# Patient Record
Sex: MALE | Race: WHITE | NOT HISPANIC OR LATINO | URBAN - METROPOLITAN AREA
[De-identification: names, ages, dates, MRNs, and addresses within clinical notes are randomized per-mention and may not be internally consistent; named-entity substitution may affect disease eponyms.]

---

## 2017-01-03 ENCOUNTER — AMBULATORY - HEALTHEAST (OUTPATIENT)
Dept: INTERNAL MEDICINE | Facility: CLINIC | Age: 62
End: 2017-01-03

## 2017-01-03 ENCOUNTER — COMMUNICATION - HEALTHEAST (OUTPATIENT)
Dept: INTERNAL MEDICINE | Facility: CLINIC | Age: 62
End: 2017-01-03

## 2017-01-03 DIAGNOSIS — K43.9 VENTRAL HERNIA: ICD-10-CM

## 2017-01-04 ENCOUNTER — COMMUNICATION - HEALTHEAST (OUTPATIENT)
Dept: INTERNAL MEDICINE | Facility: CLINIC | Age: 62
End: 2017-01-04

## 2017-01-08 ENCOUNTER — COMMUNICATION - HEALTHEAST (OUTPATIENT)
Dept: INTERNAL MEDICINE | Facility: CLINIC | Age: 62
End: 2017-01-08

## 2017-01-08 DIAGNOSIS — R52 PAIN: ICD-10-CM

## 2017-01-10 ENCOUNTER — OFFICE VISIT - HEALTHEAST (OUTPATIENT)
Dept: INTERNAL MEDICINE | Facility: CLINIC | Age: 62
End: 2017-01-10

## 2017-01-10 DIAGNOSIS — R63.4 WEIGHT LOSS: ICD-10-CM

## 2017-01-10 DIAGNOSIS — R73.9 HYPERGLYCEMIA: ICD-10-CM

## 2017-01-10 DIAGNOSIS — I10 ESSENTIAL HYPERTENSION WITH GOAL BLOOD PRESSURE LESS THAN 140/90: ICD-10-CM

## 2017-01-10 DIAGNOSIS — K43.9 VENTRAL HERNIA: ICD-10-CM

## 2017-01-10 DIAGNOSIS — M54.9 BACK PAIN: ICD-10-CM

## 2017-01-10 ASSESSMENT — MIFFLIN-ST. JEOR: SCORE: 1469.39

## 2017-01-13 ENCOUNTER — COMMUNICATION - HEALTHEAST (OUTPATIENT)
Dept: INTERNAL MEDICINE | Facility: CLINIC | Age: 62
End: 2017-01-13

## 2017-01-13 DIAGNOSIS — R52 PAIN: ICD-10-CM

## 2017-01-16 ENCOUNTER — COMMUNICATION - HEALTHEAST (OUTPATIENT)
Dept: INTERNAL MEDICINE | Facility: CLINIC | Age: 62
End: 2017-01-16

## 2017-01-16 DIAGNOSIS — R52 PAIN: ICD-10-CM

## 2017-01-22 ENCOUNTER — COMMUNICATION - HEALTHEAST (OUTPATIENT)
Dept: INTERNAL MEDICINE | Facility: CLINIC | Age: 62
End: 2017-01-22

## 2017-01-22 DIAGNOSIS — R52 PAIN: ICD-10-CM

## 2017-01-26 ENCOUNTER — AMBULATORY - HEALTHEAST (OUTPATIENT)
Dept: INTERNAL MEDICINE | Facility: CLINIC | Age: 62
End: 2017-01-26

## 2017-01-26 ENCOUNTER — COMMUNICATION - HEALTHEAST (OUTPATIENT)
Dept: SCHEDULING | Facility: CLINIC | Age: 62
End: 2017-01-26

## 2017-01-27 ENCOUNTER — COMMUNICATION - HEALTHEAST (OUTPATIENT)
Dept: INTERNAL MEDICINE | Facility: CLINIC | Age: 62
End: 2017-01-27

## 2017-01-27 ENCOUNTER — COMMUNICATION - HEALTHEAST (OUTPATIENT)
Dept: SCHEDULING | Facility: CLINIC | Age: 62
End: 2017-01-27

## 2017-01-30 ENCOUNTER — AMBULATORY - HEALTHEAST (OUTPATIENT)
Dept: INTERNAL MEDICINE | Facility: CLINIC | Age: 62
End: 2017-01-30

## 2017-01-30 ENCOUNTER — COMMUNICATION - HEALTHEAST (OUTPATIENT)
Dept: INTERNAL MEDICINE | Facility: CLINIC | Age: 62
End: 2017-01-30

## 2017-01-30 DIAGNOSIS — R52 PAIN: ICD-10-CM

## 2017-02-03 ENCOUNTER — RECORDS - HEALTHEAST (OUTPATIENT)
Dept: ADMINISTRATIVE | Facility: OTHER | Age: 62
End: 2017-02-03

## 2017-02-03 ENCOUNTER — AMBULATORY - HEALTHEAST (OUTPATIENT)
Dept: INTERNAL MEDICINE | Facility: CLINIC | Age: 62
End: 2017-02-03

## 2017-02-03 ENCOUNTER — COMMUNICATION - HEALTHEAST (OUTPATIENT)
Dept: INTERNAL MEDICINE | Facility: CLINIC | Age: 62
End: 2017-02-03

## 2017-02-03 DIAGNOSIS — G89.29 CHRONIC BILATERAL BACK PAIN, UNSPECIFIED BACK LOCATION: ICD-10-CM

## 2017-02-03 DIAGNOSIS — R10.2 HYPOGASTRIC PAIN: ICD-10-CM

## 2017-02-03 DIAGNOSIS — M54.9 CHRONIC BILATERAL BACK PAIN, UNSPECIFIED BACK LOCATION: ICD-10-CM

## 2017-02-04 ENCOUNTER — COMMUNICATION - HEALTHEAST (OUTPATIENT)
Dept: SCHEDULING | Facility: CLINIC | Age: 62
End: 2017-02-04

## 2017-02-06 ENCOUNTER — COMMUNICATION - HEALTHEAST (OUTPATIENT)
Dept: INTERNAL MEDICINE | Facility: CLINIC | Age: 62
End: 2017-02-06

## 2017-02-07 ENCOUNTER — OFFICE VISIT - HEALTHEAST (OUTPATIENT)
Dept: INTERNAL MEDICINE | Facility: CLINIC | Age: 62
End: 2017-02-07

## 2017-02-07 DIAGNOSIS — G89.29 CHRONIC BILATERAL BACK PAIN, UNSPECIFIED BACK LOCATION: ICD-10-CM

## 2017-02-07 DIAGNOSIS — M54.9 CHRONIC BILATERAL BACK PAIN, UNSPECIFIED BACK LOCATION: ICD-10-CM

## 2017-02-07 ASSESSMENT — MIFFLIN-ST. JEOR: SCORE: 1451.24

## 2017-02-15 ENCOUNTER — RECORDS - HEALTHEAST (OUTPATIENT)
Dept: ADMINISTRATIVE | Facility: OTHER | Age: 62
End: 2017-02-15

## 2017-02-16 ENCOUNTER — COMMUNICATION - HEALTHEAST (OUTPATIENT)
Dept: INTERNAL MEDICINE | Facility: CLINIC | Age: 62
End: 2017-02-16

## 2017-02-16 ENCOUNTER — RECORDS - HEALTHEAST (OUTPATIENT)
Dept: ADMINISTRATIVE | Facility: OTHER | Age: 62
End: 2017-02-16

## 2017-02-17 ENCOUNTER — RECORDS - HEALTHEAST (OUTPATIENT)
Dept: ADMINISTRATIVE | Facility: OTHER | Age: 62
End: 2017-02-17

## 2017-02-20 ENCOUNTER — RECORDS - HEALTHEAST (OUTPATIENT)
Dept: ADMINISTRATIVE | Facility: OTHER | Age: 62
End: 2017-02-20

## 2017-02-21 ENCOUNTER — COMMUNICATION - HEALTHEAST (OUTPATIENT)
Dept: INTERNAL MEDICINE | Facility: CLINIC | Age: 62
End: 2017-02-21

## 2017-03-03 ENCOUNTER — RECORDS - HEALTHEAST (OUTPATIENT)
Dept: ADMINISTRATIVE | Facility: OTHER | Age: 62
End: 2017-03-03

## 2017-03-06 ENCOUNTER — RECORDS - HEALTHEAST (OUTPATIENT)
Dept: ADMINISTRATIVE | Facility: OTHER | Age: 62
End: 2017-03-06

## 2017-03-07 ENCOUNTER — AMBULATORY - HEALTHEAST (OUTPATIENT)
Dept: INTERNAL MEDICINE | Facility: CLINIC | Age: 62
End: 2017-03-07

## 2017-03-07 ENCOUNTER — COMMUNICATION - HEALTHEAST (OUTPATIENT)
Dept: INTERNAL MEDICINE | Facility: CLINIC | Age: 62
End: 2017-03-07

## 2017-03-07 DIAGNOSIS — M54.6 BACK PAIN OF THORACOLUMBAR REGION: ICD-10-CM

## 2017-03-07 DIAGNOSIS — M54.50 BACK PAIN OF THORACOLUMBAR REGION: ICD-10-CM

## 2017-03-08 ENCOUNTER — COMMUNICATION - HEALTHEAST (OUTPATIENT)
Dept: INTERNAL MEDICINE | Facility: CLINIC | Age: 62
End: 2017-03-08

## 2017-03-10 ENCOUNTER — COMMUNICATION - HEALTHEAST (OUTPATIENT)
Dept: INTERNAL MEDICINE | Facility: CLINIC | Age: 62
End: 2017-03-10

## 2017-03-13 ENCOUNTER — RECORDS - HEALTHEAST (OUTPATIENT)
Dept: ADMINISTRATIVE | Facility: OTHER | Age: 62
End: 2017-03-13

## 2017-03-14 ENCOUNTER — COMMUNICATION - HEALTHEAST (OUTPATIENT)
Dept: INTERNAL MEDICINE | Facility: CLINIC | Age: 62
End: 2017-03-14

## 2017-03-14 ENCOUNTER — HOSPITAL ENCOUNTER (OUTPATIENT)
Dept: PET IMAGING | Facility: HOSPITAL | Age: 62
Discharge: HOME | End: 2017-03-14
Attending: INTERNAL MEDICINE

## 2017-03-14 DIAGNOSIS — M54.6 BACK PAIN OF THORACOLUMBAR REGION: ICD-10-CM

## 2017-03-14 DIAGNOSIS — M54.50 BACK PAIN OF THORACOLUMBAR REGION: ICD-10-CM

## 2017-03-14 ASSESSMENT — MIFFLIN-ST. JEOR: SCORE: 1419.49

## 2017-03-17 ENCOUNTER — OFFICE VISIT - HEALTHEAST (OUTPATIENT)
Dept: INTERNAL MEDICINE | Facility: CLINIC | Age: 62
End: 2017-03-17

## 2017-03-17 DIAGNOSIS — G89.29 CHRONIC PAIN: ICD-10-CM

## 2017-03-17 ASSESSMENT — MIFFLIN-ST. JEOR: SCORE: 1442.17

## 2017-03-20 ENCOUNTER — RECORDS - HEALTHEAST (OUTPATIENT)
Dept: ADMINISTRATIVE | Facility: OTHER | Age: 62
End: 2017-03-20

## 2017-04-05 ENCOUNTER — COMMUNICATION - HEALTHEAST (OUTPATIENT)
Dept: INTERNAL MEDICINE | Facility: CLINIC | Age: 62
End: 2017-04-05

## 2017-04-11 ENCOUNTER — COMMUNICATION - HEALTHEAST (OUTPATIENT)
Dept: INTERNAL MEDICINE | Facility: CLINIC | Age: 62
End: 2017-04-11

## 2017-04-18 ENCOUNTER — COMMUNICATION - HEALTHEAST (OUTPATIENT)
Dept: INTERNAL MEDICINE | Facility: CLINIC | Age: 62
End: 2017-04-18

## 2017-04-25 ENCOUNTER — COMMUNICATION - HEALTHEAST (OUTPATIENT)
Dept: INTERNAL MEDICINE | Facility: CLINIC | Age: 62
End: 2017-04-25

## 2017-05-02 ENCOUNTER — COMMUNICATION - HEALTHEAST (OUTPATIENT)
Dept: INTERNAL MEDICINE | Facility: CLINIC | Age: 62
End: 2017-05-02

## 2017-05-10 ENCOUNTER — COMMUNICATION - HEALTHEAST (OUTPATIENT)
Dept: INTERNAL MEDICINE | Facility: CLINIC | Age: 62
End: 2017-05-10

## 2017-05-16 ENCOUNTER — COMMUNICATION - HEALTHEAST (OUTPATIENT)
Dept: INTERNAL MEDICINE | Facility: CLINIC | Age: 62
End: 2017-05-16

## 2017-05-23 ENCOUNTER — COMMUNICATION - HEALTHEAST (OUTPATIENT)
Dept: INTERNAL MEDICINE | Facility: CLINIC | Age: 62
End: 2017-05-23

## 2017-05-31 ENCOUNTER — COMMUNICATION - HEALTHEAST (OUTPATIENT)
Dept: INTERNAL MEDICINE | Facility: CLINIC | Age: 62
End: 2017-05-31

## 2017-06-08 ENCOUNTER — COMMUNICATION - HEALTHEAST (OUTPATIENT)
Dept: INTERNAL MEDICINE | Facility: CLINIC | Age: 62
End: 2017-06-08

## 2017-06-19 ENCOUNTER — COMMUNICATION - HEALTHEAST (OUTPATIENT)
Dept: INTERNAL MEDICINE | Facility: CLINIC | Age: 62
End: 2017-06-19

## 2017-06-26 ENCOUNTER — COMMUNICATION - HEALTHEAST (OUTPATIENT)
Dept: INTERNAL MEDICINE | Facility: CLINIC | Age: 62
End: 2017-06-26

## 2017-07-03 ENCOUNTER — COMMUNICATION - HEALTHEAST (OUTPATIENT)
Dept: INTERNAL MEDICINE | Facility: CLINIC | Age: 62
End: 2017-07-03

## 2017-07-10 ENCOUNTER — COMMUNICATION - HEALTHEAST (OUTPATIENT)
Dept: INTERNAL MEDICINE | Facility: CLINIC | Age: 62
End: 2017-07-10

## 2017-07-18 ENCOUNTER — COMMUNICATION - HEALTHEAST (OUTPATIENT)
Dept: INTERNAL MEDICINE | Facility: CLINIC | Age: 62
End: 2017-07-18

## 2017-07-24 ENCOUNTER — COMMUNICATION - HEALTHEAST (OUTPATIENT)
Dept: INTERNAL MEDICINE | Facility: CLINIC | Age: 62
End: 2017-07-24

## 2017-08-01 ENCOUNTER — COMMUNICATION - HEALTHEAST (OUTPATIENT)
Dept: INTERNAL MEDICINE | Facility: CLINIC | Age: 62
End: 2017-08-01

## 2017-08-07 ENCOUNTER — COMMUNICATION - HEALTHEAST (OUTPATIENT)
Dept: INTERNAL MEDICINE | Facility: CLINIC | Age: 62
End: 2017-08-07

## 2017-08-14 ENCOUNTER — COMMUNICATION - HEALTHEAST (OUTPATIENT)
Dept: INTERNAL MEDICINE | Facility: CLINIC | Age: 62
End: 2017-08-14

## 2017-08-21 ENCOUNTER — COMMUNICATION - HEALTHEAST (OUTPATIENT)
Dept: INTERNAL MEDICINE | Facility: CLINIC | Age: 62
End: 2017-08-21

## 2017-08-28 ENCOUNTER — COMMUNICATION - HEALTHEAST (OUTPATIENT)
Dept: INTERNAL MEDICINE | Facility: CLINIC | Age: 62
End: 2017-08-28

## 2017-09-01 ENCOUNTER — COMMUNICATION - HEALTHEAST (OUTPATIENT)
Dept: INTERNAL MEDICINE | Facility: CLINIC | Age: 62
End: 2017-09-01

## 2017-09-05 ENCOUNTER — COMMUNICATION - HEALTHEAST (OUTPATIENT)
Dept: INTERNAL MEDICINE | Facility: CLINIC | Age: 62
End: 2017-09-05

## 2017-09-12 ENCOUNTER — COMMUNICATION - HEALTHEAST (OUTPATIENT)
Dept: INTERNAL MEDICINE | Facility: CLINIC | Age: 62
End: 2017-09-12

## 2017-09-20 ENCOUNTER — COMMUNICATION - HEALTHEAST (OUTPATIENT)
Dept: INTERNAL MEDICINE | Facility: CLINIC | Age: 62
End: 2017-09-20

## 2017-10-02 ENCOUNTER — COMMUNICATION - HEALTHEAST (OUTPATIENT)
Dept: INTERNAL MEDICINE | Facility: CLINIC | Age: 62
End: 2017-10-02

## 2017-10-09 ENCOUNTER — COMMUNICATION - HEALTHEAST (OUTPATIENT)
Dept: SCHEDULING | Facility: CLINIC | Age: 62
End: 2017-10-09

## 2017-10-16 ENCOUNTER — COMMUNICATION - HEALTHEAST (OUTPATIENT)
Dept: INTERNAL MEDICINE | Facility: CLINIC | Age: 62
End: 2017-10-16

## 2017-10-23 ENCOUNTER — COMMUNICATION - HEALTHEAST (OUTPATIENT)
Dept: SCHEDULING | Facility: CLINIC | Age: 62
End: 2017-10-23

## 2017-10-30 ENCOUNTER — COMMUNICATION - HEALTHEAST (OUTPATIENT)
Dept: INTERNAL MEDICINE | Facility: CLINIC | Age: 62
End: 2017-10-30

## 2017-11-07 ENCOUNTER — COMMUNICATION - HEALTHEAST (OUTPATIENT)
Dept: INTERNAL MEDICINE | Facility: CLINIC | Age: 62
End: 2017-11-07

## 2017-11-13 ENCOUNTER — COMMUNICATION - HEALTHEAST (OUTPATIENT)
Dept: INTERNAL MEDICINE | Facility: CLINIC | Age: 62
End: 2017-11-13

## 2017-11-20 ENCOUNTER — COMMUNICATION - HEALTHEAST (OUTPATIENT)
Dept: INTERNAL MEDICINE | Facility: CLINIC | Age: 62
End: 2017-11-20

## 2017-11-22 ENCOUNTER — COMMUNICATION - HEALTHEAST (OUTPATIENT)
Dept: INTERNAL MEDICINE | Facility: CLINIC | Age: 62
End: 2017-11-22

## 2017-11-22 DIAGNOSIS — M54.9 CHRONIC BILATERAL BACK PAIN, UNSPECIFIED BACK LOCATION: ICD-10-CM

## 2017-11-22 DIAGNOSIS — G89.29 CHRONIC BILATERAL BACK PAIN, UNSPECIFIED BACK LOCATION: ICD-10-CM

## 2017-11-28 ENCOUNTER — OFFICE VISIT - HEALTHEAST (OUTPATIENT)
Dept: INTERNAL MEDICINE | Facility: CLINIC | Age: 62
End: 2017-11-28

## 2017-11-28 DIAGNOSIS — G89.4 CHRONIC PAIN SYNDROME: ICD-10-CM

## 2017-11-28 DIAGNOSIS — R63.4 WEIGHT LOSS: ICD-10-CM

## 2017-11-28 DIAGNOSIS — R73.9 HYPERGLYCEMIA: ICD-10-CM

## 2017-11-28 DIAGNOSIS — M54.50 BACK PAIN OF THORACOLUMBAR REGION: ICD-10-CM

## 2017-11-28 DIAGNOSIS — R10.2 HYPOGASTRIC PAIN: ICD-10-CM

## 2017-11-28 DIAGNOSIS — M54.6 BACK PAIN OF THORACOLUMBAR REGION: ICD-10-CM

## 2017-11-28 DIAGNOSIS — G89.29 CHRONIC BILATERAL BACK PAIN, UNSPECIFIED BACK LOCATION: ICD-10-CM

## 2017-11-28 DIAGNOSIS — M54.9 CHRONIC BILATERAL BACK PAIN, UNSPECIFIED BACK LOCATION: ICD-10-CM

## 2017-11-28 LAB — HBA1C MFR BLD: 5.9 % (ref 3.5–6)

## 2017-11-28 ASSESSMENT — MIFFLIN-ST. JEOR: SCORE: 1673.5

## 2017-12-07 ENCOUNTER — COMMUNICATION - HEALTHEAST (OUTPATIENT)
Dept: INTERNAL MEDICINE | Facility: CLINIC | Age: 62
End: 2017-12-07

## 2017-12-14 ENCOUNTER — COMMUNICATION - HEALTHEAST (OUTPATIENT)
Dept: INTERNAL MEDICINE | Facility: CLINIC | Age: 62
End: 2017-12-14

## 2017-12-21 ENCOUNTER — COMMUNICATION - HEALTHEAST (OUTPATIENT)
Dept: INTERNAL MEDICINE | Facility: CLINIC | Age: 62
End: 2017-12-21

## 2018-01-02 ENCOUNTER — COMMUNICATION - HEALTHEAST (OUTPATIENT)
Dept: INTERNAL MEDICINE | Facility: CLINIC | Age: 63
End: 2018-01-02

## 2018-01-09 ENCOUNTER — OFFICE VISIT - HEALTHEAST (OUTPATIENT)
Dept: INTERNAL MEDICINE | Facility: CLINIC | Age: 63
End: 2018-01-09

## 2018-01-09 ENCOUNTER — HOSPITAL ENCOUNTER (OUTPATIENT)
Dept: CT IMAGING | Facility: CLINIC | Age: 63
Discharge: HOME | End: 2018-01-09
Attending: INTERNAL MEDICINE

## 2018-01-09 DIAGNOSIS — R10.2 HYPOGASTRIC PAIN: ICD-10-CM

## 2018-01-09 DIAGNOSIS — G89.29 CHRONIC BILATERAL BACK PAIN, UNSPECIFIED BACK LOCATION: ICD-10-CM

## 2018-01-09 DIAGNOSIS — K43.9 ABDOMINAL WALL HERNIA: ICD-10-CM

## 2018-01-09 DIAGNOSIS — M54.9 CHRONIC BILATERAL BACK PAIN, UNSPECIFIED BACK LOCATION: ICD-10-CM

## 2018-01-09 DIAGNOSIS — I10 ESSENTIAL HYPERTENSION: ICD-10-CM

## 2018-01-09 DIAGNOSIS — K43.9 VENTRAL HERNIA WITHOUT OBSTRUCTION OR GANGRENE: ICD-10-CM

## 2018-01-09 LAB
ALBUMIN SERPL-MCNC: 3.6 G/DL (ref 3.5–5)
ALP SERPL-CCNC: 164 U/L (ref 45–120)
ALT SERPL W P-5'-P-CCNC: 24 U/L (ref 0–45)
ANION GAP SERPL CALCULATED.3IONS-SCNC: 10 MMOL/L (ref 5–18)
AST SERPL W P-5'-P-CCNC: 26 U/L (ref 0–40)
BILIRUB SERPL-MCNC: 0.4 MG/DL (ref 0–1)
BUN SERPL-MCNC: 13 MG/DL (ref 8–22)
CALCIUM SERPL-MCNC: 9.8 MG/DL (ref 8.5–10.5)
CHLORIDE BLD-SCNC: 103 MMOL/L (ref 98–107)
CO2 SERPL-SCNC: 28 MMOL/L (ref 22–31)
CREAT BLD-MCNC: 1.1 MG/DL
CREAT SERPL-MCNC: 0.98 MG/DL (ref 0.7–1.3)
ERYTHROCYTE [DISTWIDTH] IN BLOOD BY AUTOMATED COUNT: 11.9 % (ref 11–14.5)
GFR SERPL CREATININE-BSD FRML MDRD: >60 ML/MIN/1.73M2
GLUCOSE BLD-MCNC: 114 MG/DL (ref 70–125)
HCT VFR BLD AUTO: 47.9 % (ref 40–54)
HGB BLD-MCNC: 15.9 G/DL (ref 14–18)
MCH RBC QN AUTO: 30.6 PG (ref 27–34)
MCHC RBC AUTO-ENTMCNC: 33.3 G/DL (ref 32–36)
MCV RBC AUTO: 92 FL (ref 80–100)
PLATELET # BLD AUTO: 275 THOU/UL (ref 140–440)
PMV BLD AUTO: 7.4 FL (ref 7–10)
POC GFR AMER AF HE - HISTORICAL: >60 ML/MIN/1.73M2
POC GFR NON AMER AF HE - HISTORICAL: >60 ML/MIN/1.73M2
POTASSIUM BLD-SCNC: 3.9 MMOL/L (ref 3.5–5)
PROT SERPL-MCNC: 7.6 G/DL (ref 6–8)
RBC # BLD AUTO: 5.21 MILL/UL (ref 4.4–6.2)
SODIUM SERPL-SCNC: 141 MMOL/L (ref 136–145)
WBC: 8.9 THOU/UL (ref 4–11)

## 2018-01-09 RX ORDER — DOCUSATE SODIUM 100 MG/1
100 CAPSULE, LIQUID FILLED ORAL 3 TIMES DAILY PRN
Status: SHIPPED | COMMUNITY
Start: 2018-01-09

## 2018-01-09 ASSESSMENT — MIFFLIN-ST. JEOR: SCORE: 1714.33

## 2018-01-12 ENCOUNTER — RECORDS - HEALTHEAST (OUTPATIENT)
Dept: ADMINISTRATIVE | Facility: OTHER | Age: 63
End: 2018-01-12

## 2018-01-15 ENCOUNTER — COMMUNICATION - HEALTHEAST (OUTPATIENT)
Dept: INTERNAL MEDICINE | Facility: CLINIC | Age: 63
End: 2018-01-15

## 2018-01-18 ENCOUNTER — ANESTHESIA - HEALTHEAST (OUTPATIENT)
Dept: SURGERY | Facility: CLINIC | Age: 63
End: 2018-01-18

## 2018-01-18 ENCOUNTER — SURGERY - HEALTHEAST (OUTPATIENT)
Dept: SURGERY | Facility: CLINIC | Age: 63
End: 2018-01-18

## 2018-01-18 ASSESSMENT — MIFFLIN-ST. JEOR: SCORE: 1703.63

## 2018-01-30 ENCOUNTER — OFFICE VISIT - HEALTHEAST (OUTPATIENT)
Dept: INTERNAL MEDICINE | Facility: CLINIC | Age: 63
End: 2018-01-30

## 2018-01-30 DIAGNOSIS — K43.9 VENTRAL HERNIA WITHOUT OBSTRUCTION OR GANGRENE: ICD-10-CM

## 2018-01-30 DIAGNOSIS — I10 ESSENTIAL HYPERTENSION: ICD-10-CM

## 2018-01-30 DIAGNOSIS — I10 ESSENTIAL HYPERTENSION WITH GOAL BLOOD PRESSURE LESS THAN 140/90: ICD-10-CM

## 2018-01-30 ASSESSMENT — MIFFLIN-ST. JEOR: SCORE: 1727.94

## 2018-01-31 ENCOUNTER — RECORDS - HEALTHEAST (OUTPATIENT)
Dept: ADMINISTRATIVE | Facility: OTHER | Age: 63
End: 2018-01-31

## 2018-02-01 ENCOUNTER — COMMUNICATION - HEALTHEAST (OUTPATIENT)
Dept: INTERNAL MEDICINE | Facility: CLINIC | Age: 63
End: 2018-02-01

## 2018-02-14 ENCOUNTER — COMMUNICATION - HEALTHEAST (OUTPATIENT)
Dept: INTERNAL MEDICINE | Facility: CLINIC | Age: 63
End: 2018-02-14

## 2018-02-14 DIAGNOSIS — G89.29 CHRONIC BILATERAL BACK PAIN, UNSPECIFIED BACK LOCATION: ICD-10-CM

## 2018-02-14 DIAGNOSIS — M54.9 CHRONIC BILATERAL BACK PAIN, UNSPECIFIED BACK LOCATION: ICD-10-CM

## 2018-02-28 ENCOUNTER — RECORDS - HEALTHEAST (OUTPATIENT)
Dept: ADMINISTRATIVE | Facility: OTHER | Age: 63
End: 2018-02-28

## 2018-03-01 ENCOUNTER — COMMUNICATION - HEALTHEAST (OUTPATIENT)
Dept: INTERNAL MEDICINE | Facility: CLINIC | Age: 63
End: 2018-03-01

## 2018-03-15 ENCOUNTER — COMMUNICATION - HEALTHEAST (OUTPATIENT)
Dept: INTERNAL MEDICINE | Facility: CLINIC | Age: 63
End: 2018-03-15

## 2018-03-30 ENCOUNTER — COMMUNICATION - HEALTHEAST (OUTPATIENT)
Dept: INTERNAL MEDICINE | Facility: CLINIC | Age: 63
End: 2018-03-30

## 2018-04-12 ENCOUNTER — COMMUNICATION - HEALTHEAST (OUTPATIENT)
Dept: SCHEDULING | Facility: CLINIC | Age: 63
End: 2018-04-12

## 2018-04-25 ENCOUNTER — COMMUNICATION - HEALTHEAST (OUTPATIENT)
Dept: INTERNAL MEDICINE | Facility: CLINIC | Age: 63
End: 2018-04-25

## 2018-05-08 ENCOUNTER — COMMUNICATION - HEALTHEAST (OUTPATIENT)
Dept: INTERNAL MEDICINE | Facility: CLINIC | Age: 63
End: 2018-05-08

## 2018-05-08 DIAGNOSIS — M54.9 CHRONIC BILATERAL BACK PAIN, UNSPECIFIED BACK LOCATION: ICD-10-CM

## 2018-05-08 DIAGNOSIS — G89.29 CHRONIC BILATERAL BACK PAIN, UNSPECIFIED BACK LOCATION: ICD-10-CM

## 2018-05-22 ENCOUNTER — COMMUNICATION - HEALTHEAST (OUTPATIENT)
Dept: INTERNAL MEDICINE | Facility: CLINIC | Age: 63
End: 2018-05-22

## 2018-06-06 ENCOUNTER — COMMUNICATION - HEALTHEAST (OUTPATIENT)
Dept: INTERNAL MEDICINE | Facility: CLINIC | Age: 63
End: 2018-06-06

## 2018-06-20 ENCOUNTER — COMMUNICATION - HEALTHEAST (OUTPATIENT)
Dept: INTERNAL MEDICINE | Facility: CLINIC | Age: 63
End: 2018-06-20

## 2018-07-05 ENCOUNTER — COMMUNICATION - HEALTHEAST (OUTPATIENT)
Dept: INTERNAL MEDICINE | Facility: CLINIC | Age: 63
End: 2018-07-05

## 2018-07-17 ENCOUNTER — COMMUNICATION - HEALTHEAST (OUTPATIENT)
Dept: INTERNAL MEDICINE | Facility: CLINIC | Age: 63
End: 2018-07-17

## 2018-07-31 ENCOUNTER — COMMUNICATION - HEALTHEAST (OUTPATIENT)
Dept: SCHEDULING | Facility: CLINIC | Age: 63
End: 2018-07-31

## 2018-08-07 ENCOUNTER — COMMUNICATION - HEALTHEAST (OUTPATIENT)
Dept: INTERNAL MEDICINE | Facility: CLINIC | Age: 63
End: 2018-08-07

## 2018-08-13 ENCOUNTER — COMMUNICATION - HEALTHEAST (OUTPATIENT)
Dept: INTERNAL MEDICINE | Facility: CLINIC | Age: 63
End: 2018-08-13

## 2018-08-13 DIAGNOSIS — M54.9 CHRONIC BILATERAL BACK PAIN, UNSPECIFIED BACK LOCATION: ICD-10-CM

## 2018-08-13 DIAGNOSIS — G89.29 CHRONIC BILATERAL BACK PAIN, UNSPECIFIED BACK LOCATION: ICD-10-CM

## 2018-08-20 ENCOUNTER — COMMUNICATION - HEALTHEAST (OUTPATIENT)
Dept: INTERNAL MEDICINE | Facility: CLINIC | Age: 63
End: 2018-08-20

## 2018-08-28 ENCOUNTER — COMMUNICATION - HEALTHEAST (OUTPATIENT)
Dept: INTERNAL MEDICINE | Facility: CLINIC | Age: 63
End: 2018-08-28

## 2018-09-07 ENCOUNTER — COMMUNICATION - HEALTHEAST (OUTPATIENT)
Dept: INTERNAL MEDICINE | Facility: CLINIC | Age: 63
End: 2018-09-07

## 2018-09-13 ENCOUNTER — COMMUNICATION - HEALTHEAST (OUTPATIENT)
Dept: INTERNAL MEDICINE | Facility: CLINIC | Age: 63
End: 2018-09-13

## 2018-09-24 ENCOUNTER — COMMUNICATION - HEALTHEAST (OUTPATIENT)
Dept: INTERNAL MEDICINE | Facility: CLINIC | Age: 63
End: 2018-09-24

## 2018-10-01 ENCOUNTER — COMMUNICATION - HEALTHEAST (OUTPATIENT)
Dept: INTERNAL MEDICINE | Facility: CLINIC | Age: 63
End: 2018-10-01

## 2018-10-08 ENCOUNTER — COMMUNICATION - HEALTHEAST (OUTPATIENT)
Dept: INTERNAL MEDICINE | Facility: CLINIC | Age: 63
End: 2018-10-08

## 2018-10-16 ENCOUNTER — COMMUNICATION - HEALTHEAST (OUTPATIENT)
Dept: SCHEDULING | Facility: CLINIC | Age: 63
End: 2018-10-16

## 2018-10-22 ENCOUNTER — COMMUNICATION - HEALTHEAST (OUTPATIENT)
Dept: INTERNAL MEDICINE | Facility: CLINIC | Age: 63
End: 2018-10-22

## 2018-10-22 DIAGNOSIS — G89.29 CHRONIC BILATERAL BACK PAIN, UNSPECIFIED BACK LOCATION: ICD-10-CM

## 2018-10-22 DIAGNOSIS — M54.9 CHRONIC BILATERAL BACK PAIN, UNSPECIFIED BACK LOCATION: ICD-10-CM

## 2018-10-30 ENCOUNTER — COMMUNICATION - HEALTHEAST (OUTPATIENT)
Dept: INTERNAL MEDICINE | Facility: CLINIC | Age: 63
End: 2018-10-30

## 2018-10-30 DIAGNOSIS — M54.9 CHRONIC BILATERAL BACK PAIN, UNSPECIFIED BACK LOCATION: ICD-10-CM

## 2018-10-30 DIAGNOSIS — G89.29 CHRONIC BILATERAL BACK PAIN, UNSPECIFIED BACK LOCATION: ICD-10-CM

## 2018-11-01 ENCOUNTER — OFFICE VISIT - HEALTHEAST (OUTPATIENT)
Dept: INTERNAL MEDICINE | Facility: CLINIC | Age: 63
End: 2018-11-01

## 2018-11-01 ENCOUNTER — COMMUNICATION - HEALTHEAST (OUTPATIENT)
Dept: INTERNAL MEDICINE | Facility: CLINIC | Age: 63
End: 2018-11-01

## 2018-11-01 DIAGNOSIS — K43.9 ABDOMINAL WALL HERNIA: ICD-10-CM

## 2018-11-01 DIAGNOSIS — M54.9 CHRONIC BILATERAL BACK PAIN, UNSPECIFIED BACK LOCATION: ICD-10-CM

## 2018-11-01 DIAGNOSIS — G89.4 CHRONIC PAIN SYNDROME: ICD-10-CM

## 2018-11-01 DIAGNOSIS — R63.4 WEIGHT LOSS: ICD-10-CM

## 2018-11-01 DIAGNOSIS — I10 ESSENTIAL HYPERTENSION WITH GOAL BLOOD PRESSURE LESS THAN 140/90: ICD-10-CM

## 2018-11-01 DIAGNOSIS — K43.9 VENTRAL HERNIA WITHOUT OBSTRUCTION OR GANGRENE: ICD-10-CM

## 2018-11-01 DIAGNOSIS — G89.29 CHRONIC BILATERAL BACK PAIN, UNSPECIFIED BACK LOCATION: ICD-10-CM

## 2018-11-01 DIAGNOSIS — R73.9 HYPERGLYCEMIA: ICD-10-CM

## 2018-11-01 DIAGNOSIS — Z00.00 ROUTINE GENERAL MEDICAL EXAMINATION AT A HEALTH CARE FACILITY: ICD-10-CM

## 2018-11-01 DIAGNOSIS — I10 ESSENTIAL HYPERTENSION: ICD-10-CM

## 2018-11-01 LAB
ALBUMIN SERPL-MCNC: 4 G/DL (ref 3.5–5)
ALP SERPL-CCNC: 133 U/L (ref 45–120)
ALT SERPL W P-5'-P-CCNC: 24 U/L (ref 0–45)
ANION GAP SERPL CALCULATED.3IONS-SCNC: 14 MMOL/L (ref 5–18)
AST SERPL W P-5'-P-CCNC: 25 U/L (ref 0–40)
BILIRUB SERPL-MCNC: 0.6 MG/DL (ref 0–1)
BUN SERPL-MCNC: 17 MG/DL (ref 8–22)
CALCIUM SERPL-MCNC: 10 MG/DL (ref 8.5–10.5)
CHLORIDE BLD-SCNC: 99 MMOL/L (ref 98–107)
CHOLEST SERPL-MCNC: 155 MG/DL
CO2 SERPL-SCNC: 26 MMOL/L (ref 22–31)
CREAT SERPL-MCNC: 1.06 MG/DL (ref 0.7–1.3)
ERYTHROCYTE [DISTWIDTH] IN BLOOD BY AUTOMATED COUNT: 12.2 % (ref 11–14.5)
FASTING STATUS PATIENT QL REPORTED: ABNORMAL
GFR SERPL CREATININE-BSD FRML MDRD: >60 ML/MIN/1.73M2
GLUCOSE BLD-MCNC: 195 MG/DL (ref 70–125)
HCT VFR BLD AUTO: 49.6 % (ref 40–54)
HDLC SERPL-MCNC: 47 MG/DL
HGB BLD-MCNC: 16.4 G/DL (ref 14–18)
LDLC SERPL CALC-MCNC: 76 MG/DL
MCH RBC QN AUTO: 29.9 PG (ref 27–34)
MCHC RBC AUTO-ENTMCNC: 33 G/DL (ref 32–36)
MCV RBC AUTO: 91 FL (ref 80–100)
PLATELET # BLD AUTO: 294 THOU/UL (ref 140–440)
PMV BLD AUTO: 7.4 FL (ref 7–10)
POTASSIUM BLD-SCNC: 4 MMOL/L (ref 3.5–5)
PROT SERPL-MCNC: 7.7 G/DL (ref 6–8)
PSA SERPL-MCNC: 2.7 NG/ML (ref 0–4.5)
RBC # BLD AUTO: 5.47 MILL/UL (ref 4.4–6.2)
SODIUM SERPL-SCNC: 139 MMOL/L (ref 136–145)
TRIGL SERPL-MCNC: 160 MG/DL
WBC: 10.8 THOU/UL (ref 4–11)

## 2018-11-01 ASSESSMENT — MIFFLIN-ST. JEOR: SCORE: 1814.12

## 2018-11-07 ENCOUNTER — COMMUNICATION - HEALTHEAST (OUTPATIENT)
Dept: INTERNAL MEDICINE | Facility: CLINIC | Age: 63
End: 2018-11-07

## 2018-11-07 DIAGNOSIS — G89.29 CHRONIC BILATERAL BACK PAIN, UNSPECIFIED BACK LOCATION: ICD-10-CM

## 2018-11-07 DIAGNOSIS — M54.9 CHRONIC BILATERAL BACK PAIN, UNSPECIFIED BACK LOCATION: ICD-10-CM

## 2018-11-08 ENCOUNTER — COMMUNICATION - HEALTHEAST (OUTPATIENT)
Dept: INTERNAL MEDICINE | Facility: CLINIC | Age: 63
End: 2018-11-08

## 2018-11-08 DIAGNOSIS — M54.9 CHRONIC BILATERAL BACK PAIN, UNSPECIFIED BACK LOCATION: ICD-10-CM

## 2018-11-08 DIAGNOSIS — G89.29 CHRONIC BILATERAL BACK PAIN, UNSPECIFIED BACK LOCATION: ICD-10-CM

## 2018-12-03 ENCOUNTER — COMMUNICATION - HEALTHEAST (OUTPATIENT)
Dept: INTERNAL MEDICINE | Facility: CLINIC | Age: 63
End: 2018-12-03

## 2018-12-03 DIAGNOSIS — G89.29 CHRONIC BILATERAL BACK PAIN, UNSPECIFIED BACK LOCATION: ICD-10-CM

## 2018-12-03 DIAGNOSIS — M54.9 CHRONIC BILATERAL BACK PAIN, UNSPECIFIED BACK LOCATION: ICD-10-CM

## 2018-12-17 ENCOUNTER — COMMUNICATION - HEALTHEAST (OUTPATIENT)
Dept: INTERNAL MEDICINE | Facility: CLINIC | Age: 63
End: 2018-12-17

## 2018-12-17 DIAGNOSIS — G89.29 CHRONIC BILATERAL BACK PAIN, UNSPECIFIED BACK LOCATION: ICD-10-CM

## 2018-12-17 DIAGNOSIS — M54.9 CHRONIC BILATERAL BACK PAIN, UNSPECIFIED BACK LOCATION: ICD-10-CM

## 2018-12-31 ENCOUNTER — COMMUNICATION - HEALTHEAST (OUTPATIENT)
Dept: INTERNAL MEDICINE | Facility: CLINIC | Age: 63
End: 2018-12-31

## 2018-12-31 DIAGNOSIS — G89.29 CHRONIC BILATERAL BACK PAIN, UNSPECIFIED BACK LOCATION: ICD-10-CM

## 2018-12-31 DIAGNOSIS — M54.9 CHRONIC BILATERAL BACK PAIN, UNSPECIFIED BACK LOCATION: ICD-10-CM

## 2019-01-14 ENCOUNTER — COMMUNICATION - HEALTHEAST (OUTPATIENT)
Dept: INTERNAL MEDICINE | Facility: CLINIC | Age: 64
End: 2019-01-14

## 2019-01-14 DIAGNOSIS — M54.9 CHRONIC BILATERAL BACK PAIN, UNSPECIFIED BACK LOCATION: ICD-10-CM

## 2019-01-14 DIAGNOSIS — G89.29 CHRONIC BILATERAL BACK PAIN, UNSPECIFIED BACK LOCATION: ICD-10-CM

## 2019-01-28 ENCOUNTER — COMMUNICATION - HEALTHEAST (OUTPATIENT)
Dept: INTERNAL MEDICINE | Facility: CLINIC | Age: 64
End: 2019-01-28

## 2019-01-28 DIAGNOSIS — G89.29 CHRONIC BILATERAL BACK PAIN, UNSPECIFIED BACK LOCATION: ICD-10-CM

## 2019-01-28 DIAGNOSIS — M54.9 CHRONIC BILATERAL BACK PAIN, UNSPECIFIED BACK LOCATION: ICD-10-CM

## 2019-02-11 ENCOUNTER — COMMUNICATION - HEALTHEAST (OUTPATIENT)
Dept: INTERNAL MEDICINE | Facility: CLINIC | Age: 64
End: 2019-02-11

## 2019-02-11 DIAGNOSIS — M54.9 CHRONIC BILATERAL BACK PAIN, UNSPECIFIED BACK LOCATION: ICD-10-CM

## 2019-02-11 DIAGNOSIS — G89.29 CHRONIC BILATERAL BACK PAIN, UNSPECIFIED BACK LOCATION: ICD-10-CM

## 2019-02-25 ENCOUNTER — COMMUNICATION - HEALTHEAST (OUTPATIENT)
Dept: INTERNAL MEDICINE | Facility: CLINIC | Age: 64
End: 2019-02-25

## 2019-02-25 DIAGNOSIS — M54.9 CHRONIC BILATERAL BACK PAIN, UNSPECIFIED BACK LOCATION: ICD-10-CM

## 2019-02-25 DIAGNOSIS — G89.29 CHRONIC BILATERAL BACK PAIN, UNSPECIFIED BACK LOCATION: ICD-10-CM

## 2019-03-13 ENCOUNTER — COMMUNICATION - HEALTHEAST (OUTPATIENT)
Dept: INTERNAL MEDICINE | Facility: CLINIC | Age: 64
End: 2019-03-13

## 2019-03-13 DIAGNOSIS — M54.9 CHRONIC BILATERAL BACK PAIN, UNSPECIFIED BACK LOCATION: ICD-10-CM

## 2019-03-13 DIAGNOSIS — G89.29 CHRONIC BILATERAL BACK PAIN, UNSPECIFIED BACK LOCATION: ICD-10-CM

## 2019-03-26 ENCOUNTER — COMMUNICATION - HEALTHEAST (OUTPATIENT)
Dept: INTERNAL MEDICINE | Facility: CLINIC | Age: 64
End: 2019-03-26

## 2019-03-26 DIAGNOSIS — G89.29 CHRONIC BILATERAL BACK PAIN, UNSPECIFIED BACK LOCATION: ICD-10-CM

## 2019-03-26 DIAGNOSIS — M54.9 CHRONIC BILATERAL BACK PAIN, UNSPECIFIED BACK LOCATION: ICD-10-CM

## 2019-04-09 ENCOUNTER — COMMUNICATION - HEALTHEAST (OUTPATIENT)
Dept: INTERNAL MEDICINE | Facility: CLINIC | Age: 64
End: 2019-04-09

## 2019-04-09 DIAGNOSIS — M54.9 CHRONIC BILATERAL BACK PAIN, UNSPECIFIED BACK LOCATION: ICD-10-CM

## 2019-04-09 DIAGNOSIS — G89.29 CHRONIC BILATERAL BACK PAIN, UNSPECIFIED BACK LOCATION: ICD-10-CM

## 2019-04-23 ENCOUNTER — COMMUNICATION - HEALTHEAST (OUTPATIENT)
Dept: INTERNAL MEDICINE | Facility: CLINIC | Age: 64
End: 2019-04-23

## 2019-04-23 DIAGNOSIS — G89.29 CHRONIC BILATERAL BACK PAIN, UNSPECIFIED BACK LOCATION: ICD-10-CM

## 2019-04-23 DIAGNOSIS — M54.9 CHRONIC BILATERAL BACK PAIN, UNSPECIFIED BACK LOCATION: ICD-10-CM

## 2019-05-07 ENCOUNTER — COMMUNICATION - HEALTHEAST (OUTPATIENT)
Dept: INTERNAL MEDICINE | Facility: CLINIC | Age: 64
End: 2019-05-07

## 2019-05-07 DIAGNOSIS — G89.29 CHRONIC BILATERAL BACK PAIN, UNSPECIFIED BACK LOCATION: ICD-10-CM

## 2019-05-07 DIAGNOSIS — M54.9 CHRONIC BILATERAL BACK PAIN, UNSPECIFIED BACK LOCATION: ICD-10-CM

## 2019-05-23 ENCOUNTER — COMMUNICATION - HEALTHEAST (OUTPATIENT)
Dept: INTERNAL MEDICINE | Facility: CLINIC | Age: 64
End: 2019-05-23

## 2019-05-23 DIAGNOSIS — G89.29 CHRONIC BILATERAL BACK PAIN, UNSPECIFIED BACK LOCATION: ICD-10-CM

## 2019-05-23 DIAGNOSIS — M54.9 CHRONIC BILATERAL BACK PAIN, UNSPECIFIED BACK LOCATION: ICD-10-CM

## 2019-06-11 ENCOUNTER — COMMUNICATION - HEALTHEAST (OUTPATIENT)
Dept: INTERNAL MEDICINE | Facility: CLINIC | Age: 64
End: 2019-06-11

## 2019-06-11 DIAGNOSIS — G89.29 CHRONIC BILATERAL BACK PAIN, UNSPECIFIED BACK LOCATION: ICD-10-CM

## 2019-06-11 DIAGNOSIS — M54.9 CHRONIC BILATERAL BACK PAIN, UNSPECIFIED BACK LOCATION: ICD-10-CM

## 2019-06-25 ENCOUNTER — COMMUNICATION - HEALTHEAST (OUTPATIENT)
Dept: INTERNAL MEDICINE | Facility: CLINIC | Age: 64
End: 2019-06-25

## 2019-06-25 DIAGNOSIS — G89.29 CHRONIC BILATERAL BACK PAIN, UNSPECIFIED BACK LOCATION: ICD-10-CM

## 2019-06-25 DIAGNOSIS — M54.9 CHRONIC BILATERAL BACK PAIN, UNSPECIFIED BACK LOCATION: ICD-10-CM

## 2019-07-09 ENCOUNTER — COMMUNICATION - HEALTHEAST (OUTPATIENT)
Dept: INTERNAL MEDICINE | Facility: CLINIC | Age: 64
End: 2019-07-09

## 2019-07-09 DIAGNOSIS — G89.29 CHRONIC BILATERAL BACK PAIN, UNSPECIFIED BACK LOCATION: ICD-10-CM

## 2019-07-09 DIAGNOSIS — M54.9 CHRONIC BILATERAL BACK PAIN, UNSPECIFIED BACK LOCATION: ICD-10-CM

## 2019-07-24 ENCOUNTER — COMMUNICATION - HEALTHEAST (OUTPATIENT)
Dept: INTERNAL MEDICINE | Facility: CLINIC | Age: 64
End: 2019-07-24

## 2019-07-24 DIAGNOSIS — M54.9 CHRONIC BILATERAL BACK PAIN, UNSPECIFIED BACK LOCATION: ICD-10-CM

## 2019-07-24 DIAGNOSIS — G89.29 CHRONIC BILATERAL BACK PAIN, UNSPECIFIED BACK LOCATION: ICD-10-CM

## 2019-08-08 ENCOUNTER — COMMUNICATION - HEALTHEAST (OUTPATIENT)
Dept: INTERNAL MEDICINE | Facility: CLINIC | Age: 64
End: 2019-08-08

## 2019-08-08 DIAGNOSIS — M54.9 CHRONIC BILATERAL BACK PAIN, UNSPECIFIED BACK LOCATION: ICD-10-CM

## 2019-08-08 DIAGNOSIS — G89.29 CHRONIC BILATERAL BACK PAIN, UNSPECIFIED BACK LOCATION: ICD-10-CM

## 2019-08-21 ENCOUNTER — COMMUNICATION - HEALTHEAST (OUTPATIENT)
Dept: INTERNAL MEDICINE | Facility: CLINIC | Age: 64
End: 2019-08-21

## 2019-08-21 DIAGNOSIS — M54.9 CHRONIC BILATERAL BACK PAIN, UNSPECIFIED BACK LOCATION: ICD-10-CM

## 2019-08-21 DIAGNOSIS — G89.29 CHRONIC BILATERAL BACK PAIN, UNSPECIFIED BACK LOCATION: ICD-10-CM

## 2019-08-26 ENCOUNTER — COMMUNICATION - HEALTHEAST (OUTPATIENT)
Dept: SCHEDULING | Facility: CLINIC | Age: 64
End: 2019-08-26

## 2019-09-05 ENCOUNTER — OFFICE VISIT - HEALTHEAST (OUTPATIENT)
Dept: INTERNAL MEDICINE | Facility: CLINIC | Age: 64
End: 2019-09-05

## 2019-09-05 DIAGNOSIS — M54.9 CHRONIC BILATERAL BACK PAIN, UNSPECIFIED BACK LOCATION: ICD-10-CM

## 2019-09-05 DIAGNOSIS — K43.9 VENTRAL HERNIA WITHOUT OBSTRUCTION OR GANGRENE: ICD-10-CM

## 2019-09-05 DIAGNOSIS — G89.29 CHRONIC BILATERAL BACK PAIN, UNSPECIFIED BACK LOCATION: ICD-10-CM

## 2019-09-05 DIAGNOSIS — R73.9 HYPERGLYCEMIA: ICD-10-CM

## 2019-09-05 DIAGNOSIS — R63.4 WEIGHT LOSS: ICD-10-CM

## 2019-09-05 DIAGNOSIS — M54.40 BILATERAL LOW BACK PAIN WITH SCIATICA, SCIATICA LATERALITY UNSPECIFIED, UNSPECIFIED CHRONICITY: ICD-10-CM

## 2019-09-05 DIAGNOSIS — R10.2 HYPOGASTRIC PAIN: ICD-10-CM

## 2019-09-05 DIAGNOSIS — K80.00 CALCULUS OF GALLBLADDER WITH ACUTE CHOLECYSTITIS WITHOUT OBSTRUCTION: ICD-10-CM

## 2019-09-05 DIAGNOSIS — G89.4 CHRONIC PAIN SYNDROME: ICD-10-CM

## 2019-09-10 ENCOUNTER — COMMUNICATION - HEALTHEAST (OUTPATIENT)
Dept: INTERNAL MEDICINE | Facility: CLINIC | Age: 64
End: 2019-09-10

## 2019-09-10 DIAGNOSIS — G89.29 CHRONIC BILATERAL BACK PAIN, UNSPECIFIED BACK LOCATION: ICD-10-CM

## 2019-09-10 DIAGNOSIS — M54.9 CHRONIC BILATERAL BACK PAIN, UNSPECIFIED BACK LOCATION: ICD-10-CM

## 2019-09-12 ENCOUNTER — COMMUNICATION - HEALTHEAST (OUTPATIENT)
Dept: LAB | Facility: CLINIC | Age: 64
End: 2019-09-12

## 2019-09-12 ENCOUNTER — AMBULATORY - HEALTHEAST (OUTPATIENT)
Dept: INTERNAL MEDICINE | Facility: CLINIC | Age: 64
End: 2019-09-12

## 2019-09-12 ENCOUNTER — AMBULATORY - HEALTHEAST (OUTPATIENT)
Dept: LAB | Facility: CLINIC | Age: 64
End: 2019-09-12

## 2019-09-12 DIAGNOSIS — R73.9 HYPERGLYCEMIA: ICD-10-CM

## 2019-09-12 DIAGNOSIS — E11.9 TYPE 2 DIABETES, HBA1C GOAL < 8% (H): ICD-10-CM

## 2019-09-13 LAB — HBA1C MFR BLD: 13.9 % (ref 4.2–6.1)

## 2019-09-17 ENCOUNTER — OFFICE VISIT - HEALTHEAST (OUTPATIENT)
Dept: INTERNAL MEDICINE | Facility: CLINIC | Age: 64
End: 2019-09-17

## 2019-09-17 DIAGNOSIS — G89.29 CHRONIC BILATERAL BACK PAIN, UNSPECIFIED BACK LOCATION: ICD-10-CM

## 2019-09-17 DIAGNOSIS — M54.9 CHRONIC BILATERAL BACK PAIN, UNSPECIFIED BACK LOCATION: ICD-10-CM

## 2019-09-17 DIAGNOSIS — Z01.818 PREOP GENERAL PHYSICAL EXAM: ICD-10-CM

## 2019-09-17 DIAGNOSIS — E11.9 TYPE 2 DIABETES, HBA1C GOAL < 8% (H): ICD-10-CM

## 2019-09-17 DIAGNOSIS — R73.9 HYPERGLYCEMIA: ICD-10-CM

## 2019-09-17 DIAGNOSIS — G89.4 CHRONIC PAIN SYNDROME: ICD-10-CM

## 2019-09-17 DIAGNOSIS — Z23 NEED FOR PROPHYLACTIC VACCINATION AND INOCULATION AGAINST INFLUENZA: ICD-10-CM

## 2019-09-17 DIAGNOSIS — K80.00 CALCULUS OF GALLBLADDER WITH ACUTE CHOLECYSTITIS WITHOUT OBSTRUCTION: ICD-10-CM

## 2019-09-17 LAB
ANION GAP SERPL CALCULATED.3IONS-SCNC: 10 MMOL/L (ref 5–18)
BUN SERPL-MCNC: 16 MG/DL (ref 8–22)
CALCIUM SERPL-MCNC: 10.4 MG/DL (ref 8.5–10.5)
CHLORIDE BLD-SCNC: 101 MMOL/L (ref 98–107)
CO2 SERPL-SCNC: 26 MMOL/L (ref 22–31)
CREAT SERPL-MCNC: 1.04 MG/DL (ref 0.7–1.3)
ERYTHROCYTE [DISTWIDTH] IN BLOOD BY AUTOMATED COUNT: 11.2 % (ref 11–14.5)
FASTING STATUS PATIENT QL REPORTED: ABNORMAL
GFR SERPL CREATININE-BSD FRML MDRD: >60 ML/MIN/1.73M2
GLUCOSE BLD-MCNC: 207 MG/DL (ref 70–125)
GLUCOSE BLD-MCNC: 229 MG/DL (ref 74–125)
HCT VFR BLD AUTO: 49.3 % (ref 40–54)
HGB BLD-MCNC: 16.1 G/DL (ref 14–18)
MCH RBC QN AUTO: 30.1 PG (ref 27–34)
MCHC RBC AUTO-ENTMCNC: 32.7 G/DL (ref 32–36)
MCV RBC AUTO: 92 FL (ref 80–100)
PLATELET # BLD AUTO: 271 THOU/UL (ref 140–440)
PMV BLD AUTO: 7.7 FL (ref 7–10)
POTASSIUM BLD-SCNC: 4.3 MMOL/L (ref 3.5–5)
RBC # BLD AUTO: 5.36 MILL/UL (ref 4.4–6.2)
SODIUM SERPL-SCNC: 137 MMOL/L (ref 136–145)
WBC: 9.7 THOU/UL (ref 4–11)

## 2019-09-19 ENCOUNTER — SURGERY - HEALTHEAST (OUTPATIENT)
Dept: SURGERY | Facility: CLINIC | Age: 64
End: 2019-09-19

## 2019-09-19 ENCOUNTER — ANESTHESIA - HEALTHEAST (OUTPATIENT)
Dept: SURGERY | Facility: CLINIC | Age: 64
End: 2019-09-19

## 2019-09-19 ASSESSMENT — MIFFLIN-ST. JEOR: SCORE: 1771.48

## 2019-10-04 ENCOUNTER — COMMUNICATION - HEALTHEAST (OUTPATIENT)
Dept: INTERNAL MEDICINE | Facility: CLINIC | Age: 64
End: 2019-10-04

## 2019-10-04 ENCOUNTER — AMBULATORY - HEALTHEAST (OUTPATIENT)
Dept: INTERNAL MEDICINE | Facility: CLINIC | Age: 64
End: 2019-10-04

## 2019-10-04 DIAGNOSIS — M54.9 CHRONIC BILATERAL BACK PAIN, UNSPECIFIED BACK LOCATION: ICD-10-CM

## 2019-10-04 DIAGNOSIS — G89.29 CHRONIC BILATERAL BACK PAIN, UNSPECIFIED BACK LOCATION: ICD-10-CM

## 2019-10-17 ENCOUNTER — COMMUNICATION - HEALTHEAST (OUTPATIENT)
Dept: INTERNAL MEDICINE | Facility: CLINIC | Age: 64
End: 2019-10-17

## 2019-10-17 DIAGNOSIS — G89.29 CHRONIC BILATERAL BACK PAIN, UNSPECIFIED BACK LOCATION: ICD-10-CM

## 2019-10-17 DIAGNOSIS — M54.9 CHRONIC BILATERAL BACK PAIN, UNSPECIFIED BACK LOCATION: ICD-10-CM

## 2019-11-01 ENCOUNTER — COMMUNICATION - HEALTHEAST (OUTPATIENT)
Dept: INTERNAL MEDICINE | Facility: CLINIC | Age: 64
End: 2019-11-01

## 2019-11-01 DIAGNOSIS — M54.9 CHRONIC BILATERAL BACK PAIN, UNSPECIFIED BACK LOCATION: ICD-10-CM

## 2019-11-01 DIAGNOSIS — G89.29 CHRONIC BILATERAL BACK PAIN, UNSPECIFIED BACK LOCATION: ICD-10-CM

## 2019-11-11 ENCOUNTER — COMMUNICATION - HEALTHEAST (OUTPATIENT)
Dept: INTERNAL MEDICINE | Facility: CLINIC | Age: 64
End: 2019-11-11

## 2019-11-11 DIAGNOSIS — G89.29 CHRONIC BILATERAL BACK PAIN, UNSPECIFIED BACK LOCATION: ICD-10-CM

## 2019-11-11 DIAGNOSIS — M54.9 CHRONIC BILATERAL BACK PAIN, UNSPECIFIED BACK LOCATION: ICD-10-CM

## 2019-11-15 ENCOUNTER — COMMUNICATION - HEALTHEAST (OUTPATIENT)
Dept: INTERNAL MEDICINE | Facility: CLINIC | Age: 64
End: 2019-11-15

## 2019-11-15 DIAGNOSIS — M54.9 CHRONIC BILATERAL BACK PAIN, UNSPECIFIED BACK LOCATION: ICD-10-CM

## 2019-11-15 DIAGNOSIS — G89.29 CHRONIC BILATERAL BACK PAIN, UNSPECIFIED BACK LOCATION: ICD-10-CM

## 2019-11-27 ENCOUNTER — COMMUNICATION - HEALTHEAST (OUTPATIENT)
Dept: INTERNAL MEDICINE | Facility: CLINIC | Age: 64
End: 2019-11-27

## 2019-11-27 DIAGNOSIS — G89.29 CHRONIC BILATERAL BACK PAIN, UNSPECIFIED BACK LOCATION: ICD-10-CM

## 2019-11-27 DIAGNOSIS — M54.9 CHRONIC BILATERAL BACK PAIN, UNSPECIFIED BACK LOCATION: ICD-10-CM

## 2019-12-17 ENCOUNTER — COMMUNICATION - HEALTHEAST (OUTPATIENT)
Dept: INTERNAL MEDICINE | Facility: CLINIC | Age: 64
End: 2019-12-17

## 2019-12-17 DIAGNOSIS — G89.29 CHRONIC BILATERAL BACK PAIN, UNSPECIFIED BACK LOCATION: ICD-10-CM

## 2019-12-17 DIAGNOSIS — M54.9 CHRONIC BILATERAL BACK PAIN, UNSPECIFIED BACK LOCATION: ICD-10-CM

## 2019-12-17 RX ORDER — HYDROMORPHONE HYDROCHLORIDE 2 MG/1
TABLET ORAL
Qty: 60 TABLET | Refills: 0 | Status: SHIPPED | OUTPATIENT
Start: 2019-12-17

## 2020-03-03 ENCOUNTER — COMMUNICATION - HEALTHEAST (OUTPATIENT)
Dept: INTERNAL MEDICINE | Facility: CLINIC | Age: 65
End: 2020-03-03

## 2020-12-30 ENCOUNTER — COMMUNICATION - HEALTHEAST (OUTPATIENT)
Dept: INTERNAL MEDICINE | Facility: CLINIC | Age: 65
End: 2020-12-30

## 2020-12-30 DIAGNOSIS — G89.29 CHRONIC BILATERAL BACK PAIN, UNSPECIFIED BACK LOCATION: ICD-10-CM

## 2020-12-30 DIAGNOSIS — M54.9 CHRONIC BILATERAL BACK PAIN, UNSPECIFIED BACK LOCATION: ICD-10-CM

## 2020-12-31 RX ORDER — GABAPENTIN 300 MG/1
CAPSULE ORAL
Qty: 0.1 CAPSULE | Refills: 0 | Status: SHIPPED | OUTPATIENT
Start: 2020-12-31

## 2021-05-27 NOTE — TELEPHONE ENCOUNTER
reviewed with no discrepancies noted.    Last filled on 3/26/2019 for #60 for a 10 day supply.    Last OV 11/01/2018.    CSA on file dated 11/01/2018.    Kourtney AMATO LPN .......... 9:49 AM  04/09/19

## 2021-05-27 NOTE — TELEPHONE ENCOUNTER
Controlled Substance Refill Request  Medication Name:   Requested Prescriptions     Pending Prescriptions Disp Refills     HYDROmorphone (DILAUDID) 2 MG tablet 60 tablet 0     Sig: One half to one tablet every 4-6 hours as needed for pain.     Date Last Fill: 3/26/2019  Pharmacy: [facility]      Submit electronically to pharmacy  Controlled Substance Agreement Date Scanned:   Encounter-Level CSA Scan Date:    There are no encounter-level csa scan date.       Last office visit with prescriber/PCP: 1/30/2018 Raghu Fox MD OR same dept: Visit date not found OR same specialty: 1/30/2018 Raghu Fox MD  Last physical: 11/1/2018 Last MTM visit: Visit date not found

## 2021-05-27 NOTE — TELEPHONE ENCOUNTER
Controlled Substance Refill Request  Medication Name:   Requested Prescriptions     Pending Prescriptions Disp Refills     HYDROmorphone (DILAUDID) 2 MG tablet 60 tablet 0     Sig: One half to one tablet every 4-6 hours as needed for pain.     Date Last Fill: 3/13/19  [facility]      Submit electronically to pharmacy  Controlled Substance Agreement Date Scanned:   Encounter-Level CSA Scan Date:    There are no encounter-level csa scan date.       Last office visit with prescriber/PCP: 1/30/2018 Raghu Fox MD OR same dept: Visit date not found OR same specialty: 1/30/2018 Raghu Fox MD  Last physical: 11/1/2018 Last MTM visit: Visit date not found

## 2021-05-28 NOTE — TELEPHONE ENCOUNTER
Controlled Substance Refill Request  Medication Name:   Requested Prescriptions     Pending Prescriptions Disp Refills     HYDROmorphone (DILAUDID) 2 MG tablet 60 tablet 0     Sig: One half to one tablet every 4-6 hours as needed for pain.     Date Last Fill: 4/9/19  [facility]      Submit electronically to pharmacy  Controlled Substance Agreement Date Scanned:   Encounter-Level CSA Scan Date:    There are no encounter-level csa scan date.       Last office visit with prescriber/PCP: 1/30/2018 Raghu Fox MD OR same dept: Visit date not found OR same specialty: 1/30/2018 Raghu Fox MD  Last physical: 11/1/2018 Last MTM visit: Visit date not found

## 2021-05-28 NOTE — TELEPHONE ENCOUNTER
Prescription Monitoring Program activity reviewed with no discrepancies noted.       Last fill per : 4/09/2019  Quantity/days supply: #60 for a 10 day supply    Controlled Substance Agreement on file: Yes  Date: 11/01/2018    Last office visit with provider:  1/30/2018 Raghu Fox MD    Please advise.    Kourtney AMATO LPN .......... 3:59 PM  04/23/19

## 2021-05-28 NOTE — TELEPHONE ENCOUNTER
Controlled Substance Refill Request  Medication Name:   Requested Prescriptions     Pending Prescriptions Disp Refills     HYDROmorphone (DILAUDID) 2 MG tablet 60 tablet 0     Sig: One half to one tablet every 4-6 hours as needed for pain.     Date Last Fill: 04/24/2019  Pharmacy: [facility]     Submit electronically to pharmacy  Controlled Substance Agreement Date Scanned:   Encounter-Level CSA Scan Date:    There are no encounter-level csa scan date.       Last office visit with prescriber/PCP: 1/30/2018 Raghu Fox MD OR same dept: Visit date not found OR same specialty: 1/30/2018 Raghu Fox MD  Last physical: 11/1/2018 Last MTM visit: Visit date not found

## 2021-05-29 NOTE — TELEPHONE ENCOUNTER
Controlled Substance Refill Request  Medication:   Requested Prescriptions     Pending Prescriptions Disp Refills     HYDROmorphone (DILAUDID) 2 MG tablet 60 tablet 0     Sig: One half to one tablet every 4-6 hours as needed for pain.     Date Last Fill: 5/2319  Pharmacy:  [id number] - [place], [place] - [address] AT [facility] [address]  [phone]   Submit electronically to pharmacy  Controlled Substance Agreement on File:   Encounter-Level CSA Scan Date:    There are no encounter-level csa scan date.       Last office visit: 1/30/2018 Raghu Fox MD

## 2021-05-29 NOTE — TELEPHONE ENCOUNTER
Controlled Substance Refill Request  Medication Name:   Requested Prescriptions     Pending Prescriptions Disp Refills     HYDROmorphone (DILAUDID) 2 MG tablet 60 tablet 0     Sig: One half to one tablet every 4-6 hours as needed for pain.     Date Last Fill: 05/07/19  Pharmacy:   [id number] - [place], [place] - [address] AT [facility] [address]    Submit electronically to pharmacy  Controlled Substance Agreement Date Scanned:   Encounter-Level CSA Scan Date:    There are no encounter-level csa scan date.       Last office visit with prescriber/PCP: 1/30/2018 Raghu Fox MD OR same dept: Visit date not found OR same specialty: 1/30/2018 Raghu Fox MD  Last physical: 11/1/2018 Last MTM visit: Visit date not found

## 2021-05-29 NOTE — TELEPHONE ENCOUNTER
Prescription Monitoring Program activity reviewed with no discrepancies noted.      Last fill per : 05/07/2019  Quantity/days supply: #60 for a 10 day supply    Controlled Substance Agreement on file: No  Date: N/A    Last office visit with provider:  11/01/2018 Raghu Fox MD    Please advise.    Kourtney AMATO LPN .......... 2:17 PM  05/23/19

## 2021-05-30 VITALS — HEIGHT: 67 IN | BODY MASS INDEX: 25.11 KG/M2 | WEIGHT: 160 LBS

## 2021-05-30 VITALS — HEIGHT: 67 IN | BODY MASS INDEX: 24.17 KG/M2 | WEIGHT: 154 LBS

## 2021-05-30 VITALS — HEIGHT: 67 IN | BODY MASS INDEX: 24.48 KG/M2 | WEIGHT: 156 LBS

## 2021-05-30 VITALS — HEIGHT: 67 IN | WEIGHT: 149 LBS | BODY MASS INDEX: 23.39 KG/M2

## 2021-05-30 NOTE — TELEPHONE ENCOUNTER
Prescription Monitoring Program activity reviewed with no discrepancies noted.      Last fill per : 6/25/19  Quantity/days supply: 60 tablets for 10 days     Controlled Substance Agreement on file: No  Date: N/A    Last office visit with provider:  11/1/2018    Please advise.

## 2021-05-30 NOTE — TELEPHONE ENCOUNTER
Controlled Substance Refill Request  Medication Name:   Requested Prescriptions     Pending Prescriptions Disp Refills     HYDROmorphone (DILAUDID) 2 MG tablet 60 tablet 0     Sig: One half to one tablet every 4-6 hours as needed for pain.     Date Last Fill: 6/11/19  [id number]      Submit electronically to pharmacy  Controlled Substance Agreement Date Scanned:   Encounter-Level CSA Scan Date:    There are no encounter-level csa scan date.       Last office visit with prescriber/PCP: 1/30/2018 Raghu Fox MD OR same dept: Visit date not found OR same specialty: 1/30/2018 Raghu Fox MD  Last physical: 11/1/2018 Last MTM visit: Visit date not found

## 2021-05-30 NOTE — TELEPHONE ENCOUNTER
Controlled Substance Refill Request  Medication Name:   Requested Prescriptions     Pending Prescriptions Disp Refills     HYDROmorphone (DILAUDID) 2 MG tablet 60 tablet 0     Sig: One half to one tablet every 4-6 hours as needed for pain.     Date Last Fill: 7/9/19  [facility]      Submit electronically to pharmacy  Controlled Substance Agreement Date Scanned:   Encounter-Level CSA Scan Date:    There are no encounter-level csa scan date.       Last office visit with prescriber/PCP: 1/30/2018 Rahgu Fox MD OR same dept: Visit date not found OR same specialty: 1/30/2018 Raghu Fox MD  Last physical: 11/1/2018 Last MTM visit: Visit date not found

## 2021-05-30 NOTE — TELEPHONE ENCOUNTER
Controlled Substance Refill Request  Medication Name:   Requested Prescriptions     Pending Prescriptions Disp Refills     HYDROmorphone (DILAUDID) 2 MG tablet 60 tablet 0     Sig: One half to one tablet every 4-6 hours as needed for pain.     Date Last Fill: 6/25/2019  [facility]     Submit electronically to pharmacy  Controlled Substance Agreement Date Scanned:   Encounter-Level CSA Scan Date:    There are no encounter-level csa scan date.       Last office visit with prescriber/PCP: 1/30/2018 Raghu Fox MD OR same dept: Visit date not found OR same specialty: 1/30/2018 Raghu Fox MD  Last physical: 11/1/2018 Last MTM visit: Visit date not found

## 2021-05-31 VITALS — WEIGHT: 205 LBS | BODY MASS INDEX: 32.18 KG/M2 | HEIGHT: 67 IN

## 2021-05-31 VITALS — WEIGHT: 217 LBS | BODY MASS INDEX: 34.06 KG/M2 | HEIGHT: 67 IN

## 2021-05-31 VITALS — WEIGHT: 211.64 LBS | HEIGHT: 67 IN | BODY MASS INDEX: 33.22 KG/M2

## 2021-05-31 VITALS — HEIGHT: 67 IN | BODY MASS INDEX: 33.59 KG/M2 | WEIGHT: 214 LBS

## 2021-05-31 NOTE — TELEPHONE ENCOUNTER
Triage call:   Patient is calling with abdominal pain that has been present since Friday. Was worse Friday night from 5pm to midnight- [place] the [place]. Reports that he was told from [place] that his gallbladder has sludge in it. Pain is located in the upper right chest below his rib cage. Describes as a Dull pain. And since then avoiding fatty foods. Tender to touch on Friday. Patient indicates pain that has been continuous.       Triaged to be seen in the ER today- patient agrees to go in and be seen. Will go to [facility]    Christi Govea RN BSBA [facility] Triage/Med Refill 8/26/2019 3:44 PM    Reason for Disposition    Constant abdominal pain lasting > 2 hours    Protocols used: ABDOMINAL PAIN - MALE-A-OH

## 2021-05-31 NOTE — TELEPHONE ENCOUNTER
Controlled Substance Refill Request  Medication Name:   Requested Prescriptions     Pending Prescriptions Disp Refills     HYDROmorphone (DILAUDID) 2 MG tablet 60 tablet 0     Sig: One half to one tablet every 4-6 hours as needed for pain.     Date Last Fill: 7/25/2019  Pharmacy: [id number]      Submit electronically to pharmacy  Controlled Substance Agreement Date Scanned:   Encounter-Level CSA Scan Date:    There are no encounter-level csa scan date.       Last office visit with prescriber/PCP: 1/30/2018 Raghu Fox MD OR same dept: Visit date not found OR same specialty: 1/30/2018 Raghu Fox MD  Last physical: 11/1/2018 Last MTM visit: Visit date not found

## 2021-05-31 NOTE — TELEPHONE ENCOUNTER
Left a detailed message with patient to see if he got to the pain clinic or not and if so which one. Also informed patient that script for Hydromorphone was sent to the pharmacy.     Zoey Ortiz LPN

## 2021-05-31 NOTE — TELEPHONE ENCOUNTER
Controlled Substance Refill Request  Medication Name:   Requested Prescriptions     Pending Prescriptions Disp Refills     HYDROmorphone (DILAUDID) 2 MG tablet 60 tablet 0     Sig: One half to one tablet every 4-6 hours as needed for pain.     Date Last Fill: 8/8/19  [facility]  Submit electronically to pharmacy  Controlled Substance Agreement Date Scanned:   Encounter-Level CSA Scan Date:    There are no encounter-level csa scan date.       Last office visit with prescriber/PCP: 1/30/2018 Raghu Fox MD OR same dept: Visit date not found OR same specialty: 1/30/2018 Raghu Fox MD  Last physical: 11/1/2018 Last MTM visit: Visit date not found      Patient relayed that he has contacted the pain clinic and was filling out the intake forms.

## 2021-06-01 ENCOUNTER — RECORDS - HEALTHEAST (OUTPATIENT)
Dept: ADMINISTRATIVE | Facility: CLINIC | Age: 66
End: 2021-06-01

## 2021-06-01 NOTE — PROGRESS NOTES
Office Visit - Follow up    Roberto Nieves   63 y.o. male    Date of Visit: 9/17/2019    Chief Complaint   Patient presents with     Pre-op Exam     Gallbladder surgery at [facility] on 9/19/2019 with Dr. 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 Mathieu Farooq.  Patient developed severe right upper quadrant and epigastric pain after consuming a fatty meal as indicated in my last notes.  Evaluation in the emergency room lithiasis and biliary sludge.    Of note is the fact the patient has a chronic pain syndrome with chronic abdominal pain and bilateral flank pain more prominent on the right flank region.  Has had extensive evaluation in the past.  Clinically we did not feel this was representative of biliary colic despite finding evidence of sludge within the biliary system.  Multiple studies both here and at the [facility] in [place] and failed to elucidate a specific cause and the patient has been managed with opioid analgesics for the past 2 years.  This has caused significant disability.    Given his recent presentation however with right upper quadrant pain in the setting of a high fatty meal and clinical impression this represented biliary colic we did refer to Dr. Mathieu Farooq for potential cholecystectomy and this is planned.    Recent labs however confirmed marked hyperglycemia.  Prior to extensive weight loss patient had mild hyperglycemia in the past however blood sugars had been normal glycemic in recent years with modest elevation of blood sugars.  Recent blood sugar in the emergency room was in excess of 300 and A1c was elevated at 13.9.  Recently started on initial dose of metformin and this will be increased after 1 week.    Blood sugar is improved.  Intensive diabetic teaching is planned we will increase metformin to 1 g twice daily with follow-up A1c in about 3 months    Personal social history as  noted.  Patient is a  who has been disabled for the last 3 years because of chronic pain syndrome.  He does not consume alcohol and is a non-smoker.  Family history is    Patient has not had recurrent symptoms of biliary colic but does have his chronic flank pain more prominent on the right side.  Review of systems is otherwise negative he has no symptoms of chest pain dyspnea or other new concerns            ROS: A comprehensive review of systems was performed and was otherwise negative except as mentioned above.     Exam  Alert and oriented he is in no acute distress vital signs as noted BMI 36 head and neck normal abdomen benign nontender bowel sounds normal no palpable abnormalities heart normal lungs clear extremities unremarkable   /80 (Patient Site: Left Arm, Patient Position: Sitting, Cuff Size: Adult Regular)   Pulse 82   Wt (!) 233 lb 1.3 oz (105.7 kg)   SpO2 94%   BMI 36.51 kg/m      Assessment and Plan  Cholelithiasis with recent episode of biliary colic    Chronic pain syndrome as noted some of his chronic pain may be related to chronic biliary pain and after further consideration and consultation with Dr. Mathieu Farooq we feel it is reasonable to proceed with cholecystectomy at current time.    Recent diagnosis of type 2 diabetes with goal A1c of less than 8.  Recently started on metformin with improvement in blood sugars.  Plan intensive diabetic teaching nutritional instruction etc.    Patient is medically stable for planned procedure    Preoperative labs are pending recent EKG is normal    Roberto was seen today for pre-op exam.    Diagnoses and all orders for this visit:    Need for prophylactic vaccination and inoculation against influenza  -     Influenza, Recombinant, Inj, Quadrivalent, PF, 18+YRS  -     Ambulatory referral to Diabetes Education Program (CDE)    Hyperglycemia  -     Ambulatory referral to Diabetes Education Program (CDE)  -     HM2(CBC w/o Differential);  Future  -     Basic Metabolic Panel  -     2(CBC w/o Differential)    Type 2 diabetes, HbA1C goal < 8% (H)  -     Glucose  -     Ambulatory referral to Diabetes Education Program (CDE)  -     2(CBC w/o Differential); Future  -     Basic Metabolic Panel  -     2(CBC w/o Differential)    Chronic bilateral back pain, unspecified back location  -     HYDROmorphone (DILAUDID) 2 MG tablet; One half to one tablet every 4-6 hours as needed for pain.  -     Ambulatory referral to Diabetes Education Program (CDE)    Chronic pain syndrome  -     Ambulatory referral to Diabetes Education Program (CDE)    Calculus of gallbladder with acute cholecystitis without obstruction  -     Ambulatory referral to Diabetes Education Program (CDE)    Preop general physical exam  -     Ambulatory referral to Diabetes Education Program (CDE)  -     2(CBC w/o Differential); Future  -     Basic Metabolic Panel  -     2(CBC w/o Differential)          Time: total time spent with the patient was 40 minutes of which >50% was spent in counseling and coordination of care as noted above        Allergies   Allergen Reactions     Amoxicillin-Pot Clavulanate Itching and Rash       Medications :  Prior to Admission medications    Medication Sig Start Date End Date Taking? Authorizing Provider   docusate sodium (COLACE) 100 MG capsule Take 100 mg by mouth 3 (three) times a day as needed for constipation.   Yes PROVIDER, HISTORICAL   gabapentin (NEURONTIN) 300 MG capsule Take 2 capsules (600 mg total) by mouth 3 (three) times a day. 2/13/19  Yes Raghu Fox MD   HYDROmorphone (DILAUDID) 2 MG tablet One half to one tablet every 4-6 hours as needed for pain. 9/17/19  Yes Raghu Fox MD   magnesium hydroxide (MILK OF MAG) 400 mg/5 mL Susp suspension Take 30 mL by mouth 2 (two) times a day as needed. 2/3/17  Yes Raghu Fox MD   metFORMIN (GLUCOPHAGE) 1000 MG tablet Take 1 tablet (1,000 mg total) by mouth 2 (two) times a day with meals.  Begin after taking 500 mg tablets once a day for 1 week 9/12/19  Yes Raghu Fox MD   metFORMIN (GLUCOPHAGE) 500 MG tablet Take 1 tablet (500 mg total) by mouth 2 (two) times a day with meals for 7 days. 9/12/19 9/19/19 Yes Raghu Fox MD   polyethylene glycol (MIRALAX) 17 gram packet Take 1 packet (17 g total) by mouth 2 (two) times a day. 2/3/17  Yes Raghu Fox MD   HYDROmorphone (DILAUDID) 2 MG tablet One half to one tablet every 4-6 hours as needed for pain. 9/10/19 9/17/19 Yes Raghu Fox MD        Past Medical History:   Past Medical History:   Diagnosis Date     Abducens nerve palsy     left eye     History of anesthesia complications     agitated     Hyperglycemia     mild     Hypertension     mild     Seizures (H) 1986    possible seizure disorder, no further seizures     Ventral hernia        Past Surgical History:   Past Surgical History:   Procedure Laterality Date     INCISIONAL HERNIA REPAIR N/A 1/18/2018    Procedure: INCISIONAL HERNIA REPAIR WITH MESH;  Surgeon: Mathieu Farooq MD;  Location: [facility];  Service:      LAPAROSCOPIC INCISIONAL / UMBILICAL / VENTRAL HERNIA REPAIR Right 10/26/2016    Procedure: HERNIA REPAIR VENTRAL LAPAROSCOPIC,RIGHT;  Surgeon: Emeterio Mckoy MD;  Location: [facility];  Service:      WISDOM TOOTH EXTRACTION         Social History:   Social History     Socioeconomic History     Marital status: Single     Spouse name: Not on file     Number of children: Not on file     Years of education: Not on file     Highest education level: Not on file   Occupational History     Not on file   Social Needs     Financial resource strain: Not on file     Food insecurity:     Worry: Not on file     Inability: Not on file     Transportation needs:     Medical: Not on file     Non-medical: Not on file   Tobacco Use     Smoking status: Never Smoker     Smokeless tobacco: Never Used   Substance and Sexual Activity     Alcohol use: No     Drug use:  No     Sexual activity: Not on file   Lifestyle     Physical activity:     Days per week: Not on file     Minutes per session: Not on file     Stress: Not on file   Relationships     Social connections:     Talks on phone: Not on file     Gets together: Not on file     Attends Cheondoism service: Not on file     Active member of club or organization: Not on file     Attends meetings of clubs or organizations: Not on file     Relationship status: Not on file     Intimate partner violence:     Fear of current or ex partner: Not on file     Emotionally abused: Not on file     Physically abused: Not on file     Forced sexual activity: Not on file   Other Topics Concern     Not on file   Social History Narrative     Not on file       Family History: No family history on file.      Raghu Fox MD

## 2021-06-01 NOTE — ANESTHESIA PREPROCEDURE EVALUATION
Anesthesia Evaluation      Patient summary reviewed   History of anesthetic complications (agitation on emergence)     Airway   Mallampati: II   Pulmonary - negative ROS and normal exam                          Cardiovascular - normal exam  Exercise tolerance: > or = 4 METS  (+) hypertension (patiet denies HTN - meds) well controlled, ,     ECG reviewed (NSR poor r wave progession in the anterior leads)  Rhythm: regular  Rate: normal,         Neuro/Psych    (+) seizures, neuromuscular disease,  chronic pain (around abdomen - on gabapentin, an d dilaudid)    Comments: H/O abducens palsy  History of seizure in 1986    Endo/Other    (+) diabetes mellitus type 2 well controlled,      Comments: H/O chronic pain, daily Dilaudid and gabapentin rx    GI/Hepatic/Renal - negative ROS      Other findings: Results for FATHER RICHA SHETTY (MRN [id number]) as of 9/19/2019 09:59    9/17/2019 14:02  Sodium: 137  Potassium: 4.3  Chloride: 101  CO2: 26  Anion Gap, Calculation: 10  BUN: 16  Creatinine: 1.04  GFR MDRD Af Amer: >60  GFR MDRD Non Af Amer: >60  Calcium: 10.4  Glucose: 207 (H)  WBC: 9.7  RBC: 5.36  Hemoglobin: 16.1  Hematocrit: 49.3  MCV: 92  MCH: 30.1  MCHC: 32.7  RDW: 11.2  Platelets: 271    H/O post-op agitation, no issues with last surgery      Dental - normal exam                          Anesthesia Plan  Planned anesthetic: general endotracheal  GAETT  Scopolamine - patient defers  Antiemetics  Ketamine (50mg with induction) - history of chronic pain on meds  Tylenol po pre op  Toradol at the end of case if okay with surgeon  Consider background propofol  Soft bite block  ASA 2   Induction: intravenous   Anesthetic plan and risks discussed with: patient  Anesthesia plan special considerations: antiemetics,   Post-op plan: routine recovery

## 2021-06-01 NOTE — ANESTHESIA POSTPROCEDURE EVALUATION
Patient: Roberto Nieves  LAPAROSCOPIC CHOLECYSTECTOMY  Anesthesia type: general    Patient location: PACU  Last vitals:   Vitals Value Taken Time   /80 9/19/2019  2:30 PM   Temp 36.2  C (97.2  F) 9/19/2019  1:55 PM   Pulse 85 9/19/2019  2:42 PM   Resp 28 9/19/2019  2:37 PM   SpO2 96 % 9/19/2019  2:42 PM   Vitals shown include unvalidated device data.  Post vital signs: stable  Level of consciousness: awake and responds to simple questions  Post-anesthesia pain: pain controlled  Post-anesthesia nausea and vomiting: no  Pulmonary: unassisted, return to baseline  Cardiovascular: stable and blood pressure at baseline  Hydration: adequate  Anesthetic events: no    QCDR Measures:  ASA# 11 - Linda-op Cardiac Arrest: ASA11B - Patient did NOT experience unanticipated cardiac arrest  ASA# 12 - Linda-op Mortality Rate: ASA12B - Patient did NOT die  ASA# 13 - PACU Re-Intubation Rate: ASA13B - Patient did NOT require a new airway mgmt  ASA# 10 - Composite Anes Safety: ASA10A - No serious adverse event    Additional Notes:

## 2021-06-01 NOTE — PROGRESS NOTES
Office Visit - Follow up    Roberto Nieves   63 y.o. male    Date of Visit: 9/5/2019    Chief Complaint   Patient presents with     Hospital Visit Follow Up     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  Some of the pain chronically has been hypogastric but for the most part it has been primarily in the right flank radiating through to the abdomen and around to the right upper quadrant.  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  He has been maintained on gabapentin and hydromorphone at stable doses.  See my notes from his last physical earlier this year.    While at the [facility] fair he did consume a high fatty meal and subsequently developed typical right upper quadrant pain and was evaluated in the emergency room by Dr. Lopez    Evaluation included an ultrasound which revealed cholelithiasis.  Laboratory studies were negative.  Pain has subsided.    Patient had his typical chronic pain but no recurrent severe right upper quadrant pain.    Also of note was a elevated blood sugar        ROS: A comprehensive review of systems was performed and was otherwise negative except as mentioned above.     Exam  Alert and oriented he is in no acute distress abdominal exam is benign heart and lungs negative   /78 (Patient Site: Right Arm, Patient Position: Sitting, Cuff Size: Adult Regular)   Pulse 90   Wt (!) 235 lb (106.6 kg)   SpO2 95%   BMI 36.81 kg/m      Assessment and Plan  Abdominal pain as noted suggestive of biliary colic.  I will refer him to Dr. Mathieu Farooq for discussion regarding potential cholecystectomy.  I favor proceeding with this although indicated I certainly did not feel it was the cause of his long-term abdominal pain/flank pain.    New onset of hyperglycemia A1c pending    Roberto  was seen today for hospital visit follow up.    Diagnoses and all orders for this visit:    Bilateral low back pain with sciatica, sciatica laterality unspecified, unspecified chronicity    Chronic bilateral back pain, unspecified back location    Weight loss    Ventral hernia without obstruction or gangrene    Chronic pain syndrome    Hypogastric pain    Calculus of gallbladder with acute cholecystitis without obstruction  -     Ambulatory referral to General Surgery          Time: total time spent with the patient was 35 minutes of which >50% was spent in counseling and coordination of care        Allergies   Allergen Reactions     Amoxicillin-Pot Clavulanate Itching and Rash       Medications :  Prior to Admission medications    Medication Sig Start Date End Date Taking? Authorizing Provider   docusate sodium (COLACE) 100 MG capsule Take 100 mg by mouth 3 (three) times a day as needed for constipation.   Yes PROVIDER, HISTORICAL   gabapentin (NEURONTIN) 300 MG capsule Take 2 capsules (600 mg total) by mouth 3 (three) times a day. 2/13/19  Yes Raghu Fox MD   HYDROmorphone (DILAUDID) 2 MG tablet One half to one tablet every 4-6 hours as needed for pain. 8/22/19  Yes Raghu Fox MD   magnesium hydroxide (MILK OF MAG) 400 mg/5 mL Susp suspension Take 30 mL by mouth 2 (two) times a day as needed. 2/3/17  Yes Raghu Fox MD   polyethylene glycol (MIRALAX) 17 gram packet Take 1 packet (17 g total) by mouth 2 (two) times a day. 2/3/17  Yes Raghu Fox MD        Past Medical History:   Past Medical History:   Diagnosis Date     Abducens nerve palsy     left eye     History of anesthesia complications     agitated     Hyperglycemia     mild     Hypertension     mild     Seizures (H) 1986    possible seizure disorder, no further seizures     Ventral hernia        Past Surgical History:   Past Surgical History:   Procedure Laterality Date     INCISIONAL HERNIA REPAIR N/A 1/18/2018    Procedure:  INCISIONAL HERNIA REPAIR WITH MESH;  Surgeon: Mathieu Farooq MD;  Location: [facility];  Service:      LAPAROSCOPIC INCISIONAL / UMBILICAL / VENTRAL HERNIA REPAIR Right 10/26/2016    Procedure: HERNIA REPAIR VENTRAL LAPAROSCOPIC,RIGHT;  Surgeon: Emeterio Mckoy MD;  Location: [facility];  Service:      WISDOM TOOTH EXTRACTION         Social History:   Social History     Socioeconomic History     Marital status: Single     Spouse name: Not on file     Number of children: Not on file     Years of education: Not on file     Highest education level: Not on file   Occupational History     Not on file   Social Needs     Financial resource strain: Not on file     Food insecurity:     Worry: Not on file     Inability: Not on file     Transportation needs:     Medical: Not on file     Non-medical: Not on file   Tobacco Use     Smoking status: Never Smoker     Smokeless tobacco: Never Used   Substance and Sexual Activity     Alcohol use: No     Drug use: No     Sexual activity: Not on file   Lifestyle     Physical activity:     Days per week: Not on file     Minutes per session: Not on file     Stress: Not on file   Relationships     Social connections:     Talks on phone: Not on file     Gets together: Not on file     Attends Mandaen service: Not on file     Active member of club or organization: Not on file     Attends meetings of clubs or organizations: Not on file     Relationship status: Not on file     Intimate partner violence:     Fear of current or ex partner: Not on file     Emotionally abused: Not on file     Physically abused: Not on file     Forced sexual activity: Not on file   Other Topics Concern     Not on file   Social History Narrative     Not on file       Family History: No family history on file.      Raghu Fox MD

## 2021-06-01 NOTE — ANESTHESIA CARE TRANSFER NOTE
Last vitals:   Vitals:    09/19/19 1355   BP: 166/76   Pulse: 81   Resp: 16   Temp: 36.2  C (97.2  F)   SpO2: 97%     Patient's level of consciousness is drowsy  Spontaneous respirations: yes  Maintains airway independently: yes  Dentition unchanged: yes  Oropharynx: oropharynx clear of all foreign objects    QCDR Measures:  ASA# 20 - Surgical Safety Checklist: WHO surgical safety checklist completed prior to induction    PQRS# 430 - Adult PONV Prevention: 4558F - Pt received => 2 anti-emetic agents (different classes) preop & intraop  ASA# 8 - Peds PONV Prevention: NA - Not pediatric patient, not GA or 2 or more risk factors NOT present  PQRS# 424 - Linda-op Temp Management: 4559F - At least one body temp DOCUMENTED => 35.5C or 95.9F within required timeframe  PQRS# 426 - PACU Transfer Protocol: - Transfer of care checklist used  ASA# 14 - Acute Post-op Pain: ASA14B - Patient did NOT experience pain >= 7 out of 10

## 2021-06-01 NOTE — PROGRESS NOTES
Blood sugar was significantly elevated in the emergency room.  A1c is extremely high likely over 12.  I did leave a message for him.  We will begin metformin 500 mg twice a day.  After 1 week will increase to 1 g twice daily.  I have asked him to see me for follow-up visit next week at which time we will discuss diabetic teaching glucose monitoring medications etc.

## 2021-06-01 NOTE — TELEPHONE ENCOUNTER
Controlled Substance Refill Request  Medication Name:   Requested Prescriptions     Pending Prescriptions Disp Refills     HYDROmorphone (DILAUDID) 2 MG tablet 60 tablet 0     Sig: One half to one tablet every 4-6 hours as needed for pain.     Date Last Fill: 8/22/2019  Pharmacy: [facility] #[id number] - [place], [place] - [address] AT [facility] [address]      Submit electronically to pharmacy  Controlled Substance Agreement Date Scanned:   Encounter-Level CSA Scan Date:    There are no encounter-level csa scan date.       Last office visit with prescriber/PCP: 9/5/2019 Raghu Fox MD OR same dept: 9/5/2019 Raghu Fox MD OR same specialty: 9/5/2019 Raghu Fox MD  Last physical: 11/1/2018 Last MTM visit: Visit date not found

## 2021-06-01 NOTE — TELEPHONE ENCOUNTER
Roberto' A1C will need to be run at the [facility] lab.  [facility]'s analyzer reading is >14.   163

## 2021-06-02 VITALS — WEIGHT: 236 LBS | HEIGHT: 67 IN | BODY MASS INDEX: 37.04 KG/M2

## 2021-06-02 NOTE — TELEPHONE ENCOUNTER
Controlled Substance Refill Request  Medication Name:   Requested Prescriptions     Pending Prescriptions Disp Refills     HYDROmorphone (DILAUDID) 2 MG tablet 60 tablet 0     Sig: One half to one tablet every 4-6 hours as needed for pain.   Date Last Fill: 9/17/19  Pharmacy: [facility] # [id number]      Submit electronically to pharmacy  Controlled Substance Agreement Date Scanned:   Encounter-Level CSA Scan Date:    There are no encounter-level csa scan date.       Last office visit with prescriber/PCP: 9/5/2019 Raghu Fox MD OR same dept: 9/5/2019 Raghu Fox MD OR same specialty: 9/5/2019 Raghu Fox MD  Last physical: 9/17/2019 Last MTM visit: Visit date not found

## 2021-06-02 NOTE — TELEPHONE ENCOUNTER
Prescription Monitoring Program activity reviewed with no discrepancies noted.      Last fill per : 10/17/19  Quantity/days supply: 60 tablets for 10 days    Controlled Substance Agreement on file: No  Date: NA    Last office visit with provider:  9/5/2019 Raghu Fox MD    Please advise.

## 2021-06-02 NOTE — TELEPHONE ENCOUNTER
Patient reported he will be out of his medication by Monday, 11/4/2019.    Controlled Substance Refill Request  Medication Name:   Requested Prescriptions     Pending Prescriptions Disp Refills     HYDROmorphone (DILAUDID) 2 MG tablet 60 tablet 0     Sig: One half to one tablet every 4-6 hours as needed for pain.     Date Last Fill: 10/17/2019  Pharmacy: [facility] ([facility]      Submit electronically to pharmacy  Controlled Substance Agreement Date Scanned:   Encounter-Level CSA Scan Date:    There are no encounter-level csa scan date.       Last office visit with prescriber/PCP: 9/5/2019 Raghu Fox MD OR same dept: 9/5/2019 Raghu Fox MD OR same specialty: 9/5/2019 Raghu Fox MD  Last physical: 9/17/2019 Last MTM visit: Visit date not found

## 2021-06-02 NOTE — TELEPHONE ENCOUNTER
Patient is out of medication requesting to process as soon as possible.  Controlled Substance Refill Request  Medication Name:   Requested Prescriptions     Pending Prescriptions Disp Refills     HYDROmorphone (DILAUDID) 2 MG tablet 60 tablet 0     Sig: One half to one tablet every 4-6 hours as needed for pain.   Date Last Fill: 10/4/19  Pharmacy: [facility] # [id number]      Submit electronically to pharmacy  Controlled Substance Agreement Date Scanned:   Encounter-Level CSA Scan Date:    There are no encounter-level csa scan date.       Last office visit with prescriber/PCP: 9/5/2019 Raghu Fox MD OR same dept: 9/5/2019 Raghu Fox MD OR same specialty: 9/5/2019 Raghu Fox MD  Last physical: 9/17/2019 Last MTM visit: Visit date not found

## 2021-06-03 VITALS — HEIGHT: 67 IN | WEIGHT: 226.6 LBS | BODY MASS INDEX: 35.56 KG/M2

## 2021-06-03 VITALS
OXYGEN SATURATION: 94 % | HEART RATE: 82 BPM | SYSTOLIC BLOOD PRESSURE: 136 MMHG | DIASTOLIC BLOOD PRESSURE: 80 MMHG | BODY MASS INDEX: 36.51 KG/M2 | WEIGHT: 233.08 LBS

## 2021-06-03 VITALS
OXYGEN SATURATION: 95 % | HEART RATE: 90 BPM | BODY MASS INDEX: 36.81 KG/M2 | DIASTOLIC BLOOD PRESSURE: 78 MMHG | SYSTOLIC BLOOD PRESSURE: 132 MMHG | WEIGHT: 235 LBS

## 2021-06-03 NOTE — TELEPHONE ENCOUNTER
Refill Approved    Rx renewed per Medication Renewal Policy. Medication was last renewed on 2/13/19 .    Tashia Nuno, [facility] Triage/Med Refill 11/11/2019     Requested Prescriptions   Pending Prescriptions Disp Refills     gabapentin (NEURONTIN) 300 MG capsule [Pharmacy Med Name: GABAPENTIN 300MG CAPSULES] 540 capsule 0     Sig: TAKE 2 CAPSULES(600 MG) BY MOUTH THREE TIMES DAILY       Gabapentin/Levetiracetam/Tiagabine Refill Protocol  Passed - 11/11/2019  5:11 PM        Passed - PCP or prescribing provider visit in past 12 months or next 3 months     Last office visit with prescriber/PCP: 9/5/2019 Raghu Fox MD OR same dept: 9/5/2019 Raghu Fox MD OR same specialty: 9/5/2019 Raghu Fox MD  Last physical: 9/17/2019 Last MTM visit: Visit date not found   Next visit within 3 mo: Visit date not found  Next physical within 3 mo: Visit date not found  Prescriber OR PCP: Raghu Fox MD  Last diagnosis associated with med order: 1. Chronic bilateral back pain, unspecified back location  - gabapentin (NEURONTIN) 300 MG capsule [Pharmacy Med Name: GABAPENTIN 300MG CAPSULES]; TAKE 2 CAPSULES(600 MG) BY MOUTH THREE TIMES DAILY  Dispense: 540 capsule; Refill: 0    If protocol passes may refill for 12 months if within 3 months of last provider visit (or a total of 15 months).

## 2021-06-03 NOTE — TELEPHONE ENCOUNTER
Patient has enough medication to get him to this weekend.        Controlled Substance Refill Request  Medication Name:   Requested Prescriptions     Pending Prescriptions Disp Refills     HYDROmorphone (DILAUDID) 2 MG tablet 60 tablet 0     Sig: One half to one tablet every 4-6 hours as needed for pain.     Date Last Fill: 11/15/19  Pharmacy: [facility] #[id number]       Submit electronically to pharmacy  Controlled Substance Agreement Date Scanned:   Encounter-Level CSA Scan Date:    There are no encounter-level csa scan date.       Last office visit with prescriber/PCP: 9/5/2019 Raghu Fox MD OR same dept: 9/5/2019 Raghu Fox MD OR same specialty: 9/5/2019 Raghu Fox MD  Last physical: 9/17/2019 Last MTM visit: Visit date not found

## 2021-06-03 NOTE — TELEPHONE ENCOUNTER
Patient has two days worth of medication left.  Patient hopes to get this refilled today.  Please notify patient when prescription is sent.        Controlled Substance Refill Request  Medication Name:   Requested Prescriptions     Pending Prescriptions Disp Refills     HYDROmorphone (DILAUDID) 2 MG tablet 60 tablet 0     Sig: One half to one tablet every 4-6 hours as needed for pain.     Date Last Fill: 11/1/19  Pharmacy: [facility] #[id number]      Submit electronically to pharmacy  Controlled Substance Agreement Date Scanned:   Encounter-Level CSA Scan Date:    There are no encounter-level csa scan date.       Last office visit with prescriber/PCP: 9/5/2019 Raghu Fox MD OR same dept: 9/5/2019 Raghu Fox MD OR same specialty: 9/5/2019 Raghu Fox MD  Last physical: 9/17/2019 Last MTM visit: Visit date not found

## 2021-06-04 NOTE — TELEPHONE ENCOUNTER
Prescription Monitoring Program activity reviewed with no discrepancies noted.      Last fill per : 11/27/2019  Quantity/days supply: #60 for a 10 day supply    Controlled Substance Agreement on file: No  Date:     Last office visit with provider:  9/5/2019 Raghu Fox MD    Please advise.    Kourtney AMATO LPN .......... 11:24 AM  12/17/19

## 2021-06-04 NOTE — TELEPHONE ENCOUNTER
Controlled Substance Refill Request  Medication Name:   Requested Prescriptions     Pending Prescriptions Disp Refills     HYDROmorphone (DILAUDID) 2 MG tablet 60 tablet 0     Sig: One half to one tablet every 4-6 hours as needed for pain.     Date Last Fill: 11/27/19  Pharmacy:  [facility] #[id number] - [place], [place] - [address] AT [facility] [address]   Submit electronically to pharmacy  Controlled Substance Agreement Date Scanned:   Encounter-Level CSA Scan Date - 11/01/2018:    Scan on 11/6/2018  1:46 PM       Last office visit with prescriber/PCP: 9/5/2019 Raghu Fox MD OR same dept: 9/5/2019 Raghu Fox MD OR same specialty: 9/5/2019 Raghu Fox MD  Last physical: 9/17/2019 Last MTM visit: Visit date not found

## 2021-06-06 NOTE — TELEPHONE ENCOUNTER
Previous Ruddy pt over due for DM check up  Please help schedule pt for DM check up and est care with a new provider

## 2021-06-08 NOTE — PROGRESS NOTES
"Office Visit - Follow up    Roberto Nieves   61 y.o. male    Date of Visit: 1/10/2017    Chief Complaint   Patient presents with     Follow-up       Subjective: Very perplexing 61-year-old  who has had disabling pain in the back and right flank and right upper quadrant for the past several months.  Had been seen last summer well without any specific complaints however this fall and presented to the emergency room with right flank and right upper quadrant pain.  Notes from emergency room visit are reviewed and studies included normal laboratory studies and a CT scan which was unremarkable    Was found to have a \"bulge\" in the right upper quadrant and was found to have a ventral hernia and was seen by Dr. Mckoy and had laparoscopic takedown of the hernia.  Continued however to have significant abdominal pain requiring narcotic analgesics    Weight loss of about 40 pounds has been noted he relates this to inability to eat and disability related to pain.  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    Recent diagnostic studies included normal metabolic profile sed rate A1c and CBC.  In addition a recent MR of the lumbar spine was nondiagnostic.  Previous CT of the abdomen was negative as well urinalyses have been normal    The pain appears to have migrated exclusively to the back and is now in the upper lumbar or lower thoracic region of the back.  He remains severe and disabling he is taking 8 hydrocodone daily in addition to gabapentin.    He denies radiation of the pain denies leg pain his discomfort is much more prominent with sitting and lying and with movement.  Denies any trauma or injury to his knowledge and notes that the onset of this discomfort was fairly precipitous.  Eyes no nausea or vomiting although appetite is very poor no change in bowel habits no urinary symptoms        ROS: A comprehensive review of systems was performed and was " "otherwise negative except as mentioned above.     Exam  Abdominal exam is entirely benign he does have bilateral back tenderness    The tenderness is primarily in the paraspinal region    No definite spasm etc.     Visit Vitals     /82     Pulse 82     Ht 5' 7\" (1.702 m)     Wt 160 lb (72.6 kg)     BMI 25.06 kg/m2       Assessment and Plan   I will arrange for him to see Dr. Donavon Avalos.  Dr. Avalos was suggested by an associate and is an excellent clinician for back pain.  We have arranged for immediate evaluation within the next 2-3 days and I will be in touch with Dr. Avalos as well    Follow-up after evaluation in impression by Dr. Avalos    Roberto was seen today for follow-up.    Diagnoses and all orders for this visit:    Ventral hernia    Essential hypertension with goal blood pressure less than 140/90    Hyperglycemia    Weight loss    Back pain          Time: total time spent with the patient was 35 minutes of which >50% was spent in counseling and coordination of care        Allergies   Allergen Reactions     Amoxicillin-Pot Clavulanate Itching and Rash       Medications :  Prior to Admission medications    Medication Sig Start Date End Date Taking? Authorizing Provider   gabapentin (NEURONTIN) 400 MG capsule Take 1 capsule (400 mg total) by mouth 3 (three) times a day. 1/3/17 1/3/18 Yes Raghu Fox MD   oxyCODONE-acetaminophen (PERCOCET) 5-325 mg per tablet TK 1-2 T PO Q 4 H PRN P 1/9/17  Yes Raghu Fox MD   acetaminophen (TYLENOL) 500 MG tablet Take 1,000 mg by mouth every 6 (six) hours as needed for pain.    PROVIDER, HISTORICAL   HYDROcodone-acetaminophen (NORCO) 5-325 mg per tablet Take 1-2 tablets by mouth every 4 (four) hours as needed for pain. 10/26/16 1/10/17  Emeterio Mckoy MD        Past Medical History:   Past Medical History   Diagnosis Date     Abducens nerve palsy      left eye     History of anesthesia complications      agitated     Hyperglycemia      mild     Hypertension  "     mild     Seizures 1986     possible seizure disorder, no further seizures     Ventral hernia        Past Surgical History:   Past Surgical History   Procedure Laterality Date     [place] tooth extraction       Laparoscopic incisional / umbilical / ventral hernia repair Right 10/26/2016     Procedure: HERNIA REPAIR VENTRAL LAPAROSCOPIC,RIGHT;  Surgeon: Emeterio Mckoy MD;  Location: [facility];  Service:        Social History:   Social History     Social History     Marital status: Single     Spouse name: N/A     Number of children: N/A     Years of education: N/A     Occupational History     Not on file.     Social History Main Topics     Smoking status: Never Smoker     Smokeless tobacco: Never Used     Alcohol use No     Drug use: No     Sexual activity: Not on file     Other Topics Concern     Not on file     Social History Narrative       Family History: No family history on file.      Raghu Fox MD

## 2021-06-08 NOTE — PROGRESS NOTES
I reviewed the MRI which does not reveal any abnormality which could explain the patient's pain.    I am really at a loss to explain this patient's severe and disabling pain.  Evaluation thus far has included an emergency room visit evaluation in this office on 2 occasions and evaluation by Dr. Vladimir Mckoy who did proceed with laparoscopic ventral hernia repair.    Labs including metabolic profile sed rate A1c urinalysis and CBC were negative    For now we will empirically begin a trial of gabapentin 400 mg 3 times a day for pain he will continue with oxycodone for now and I will see him next week

## 2021-06-08 NOTE — PROGRESS NOTES
"Office Visit - Follow up    Roberto Nieves   61 y.o. male    Date of Visit: 2/7/2017    Chief Complaint   Patient presents with     Follow-up     Hospital follow up       Subjective: Father time comes in for follow-up after recent admission to the hospital for somewhat migratory but severely intense back and at times abdominal pain.  Etiology is uncertain.  He was discharged on a reduced dose of oxycodone however again presented to the emergency room with severe pain and was placed on oral hydromorphone tablets.  He finds if he takes 1-6 tablets daily of the 2 mg dose that he is reasonably comfortable.  Arrangements are currently made for evaluation in [place] at the [facility] and this will be scheduled in the near future.    Bowels have been evacuated he has experienced excellent laxative affect and has noted no real change in his pain.  I had been hopeful that much of his pain was related to the large amount of stool in his colon however he has experienced no real relief despite multiple loose bowel movements        ROS: A comprehensive review of systems was performed and was otherwise negative except as mentioned above.     Exam  Alert and oriented he is in pain weight is as noted abdomen negative back exam negative     Visit Vitals     /80     Pulse 100     Ht 5' 7\" (1.702 m)     Wt 156 lb (70.8 kg)     BMI 24.43 kg/m2       Assessment and Plan   Refractory pain as previously described of uncertain etiology continue with analgesics for now I refilled hydromorphone 60 tablets should last for at least 10-12 days follow-up after evaluation in [place]    Roberto was seen today for follow-up.    Diagnoses and all orders for this visit:    Chronic bilateral back pain, unspecified back location    Other orders  -     HYDROmorphone (DILAUDID) 2 MG tablet; One half to one tablet every 4-6 hours as needed for pain.          Time: total time spent with the patient was 20 minutes of which >50% was spent in " counseling and coordination of care        Allergies   Allergen Reactions     Amoxicillin-Pot Clavulanate Itching and Rash       Medications :  Prior to Admission medications    Medication Sig Start Date End Date Taking? Authorizing Provider   gabapentin (NEURONTIN) 400 MG capsule Take 1 capsule (400 mg total) by mouth 3 (three) times a day. 1/3/17 1/3/18 Yes Raghu Fox MD   HYDROmorphone (DILAUDID) 2 MG tablet One half to one tablet every 4-6 hours as needed for pain. 2/7/17  Yes Raghu Fox MD   magnesium hydroxide (MILK OF MAG) 400 mg/5 mL Susp suspension Take 30 mL by mouth 2 (two) times a day as needed. 2/3/17  Yes Raghu Fox MD   polyethylene glycol (MIRALAX) 17 gram packet Take 1 packet (17 g total) by mouth 2 (two) times a day. 2/3/17  Yes Raghu Fox MD   HYDROmorphone (DILAUDID) 2 MG tablet One half to one tablet every 4-6 hours as needed for pain.  Patient taking differently: Take 2 mg by mouth every 4 (four) hours as needed. One half to one tablet every 4-6 hours as needed for pain. 2/4/17 2/7/17 Yes Fuad Brown MD   acetaminophen (TYLENOL) 500 MG tablet Take 2 tablets (1,000 mg total) by mouth every 6 (six) hours as needed for pain (Up to 4 pills daily if taking with oxycodone). 2/3/17 2/7/17  Raghu Fox MD   oxyCODONE-acetaminophen (PERCOCET) 5-325 mg per tablet Up to 4 pills daily for pain 2/3/17 2/7/17  Raghu Fox MD        Past Medical History:   Past Medical History:   Diagnosis Date     Abducens nerve palsy     left eye     History of anesthesia complications     agitated     Hyperglycemia     mild     Hypertension     mild     Seizures 1986    possible seizure disorder, no further seizures     Ventral hernia        Past Surgical History:   Past Surgical History:   Procedure Laterality Date     LAPAROSCOPIC INCISIONAL / UMBILICAL / VENTRAL HERNIA REPAIR Right 10/26/2016    Procedure: HERNIA REPAIR VENTRAL LAPAROSCOPIC,RIGHT;  Surgeon: Emeterio Mckoy MD;  Location:  [facility] OR;  Service:      WISDOM TOOTH EXTRACTION         Social History:   Social History     Social History     Marital status: Single     Spouse name: N/A     Number of children: N/A     Years of education: N/A     Occupational History     Not on file.     Social History Main Topics     Smoking status: Never Smoker     Smokeless tobacco: Never Used     Alcohol use No     Drug use: No     Sexual activity: Not on file     Other Topics Concern     Not on file     Social History Narrative       Family History: No family history on file.      Raghu Fox MD

## 2021-06-09 NOTE — PROGRESS NOTES
"Office Visit - Follow up    Roberto Nieves   61 y.o. male    Date of Visit: 3/17/2017    Chief Complaint   Patient presents with     Follow-up       Subjective: Father time is here for follow-up after recent extensive evaluation in [place] at the [facility].  I have reviewed the notes by Dr. Kelley and evaluation by the pain physician Dr. Rick also underwent urology evaluation for renal lithiasis and UTI or prostatitis    Again all studies have been negative and have not been able to explain the patient's dramatic and disabling pain.  All of Dr. Kelley's notes are reviewed as are those of the consultants    A recent PET scan was performed and this revealed minimal increase in uptake in the left paraspinal tissue although this certainly is out of proportion to his disabling pain and we had a long discussion about this.  No new symptoms or concerns    5 indicates he feels he is dying he notes progression and an extension of pain to larger areas of his back and abdomen.  He indicates he feels that possibly an answer will be found at his autopsy.  He does feel down emotionally and quite discouraged which is understandable        ROS: A comprehensive review of systems was performed and was otherwise negative except as mentioned above.     Exam  No change weight is relatively stable for the first time.  He appears malnourished and chronically ill     Visit Vitals     /80     Pulse 86     Ht 5' 7\" (1.702 m)     Wt 154 lb (69.9 kg)     BMI 24.12 kg/m2       Assessment and Plan   I do not have a good explanation for the patient's pain this is extremely frustrating I will speak with Dr. Kelley in [place].    I have recommended consideration of physical therapy and will refer to the pain clinic will discuss with pain clinic personnel are findings thus far and will appreciate their input.    It is simply not satisfactory to keep him on high-dose narcotics without an explanation of pain however I do not have an " additional answer.  I have asked him to attempt to reduce pain medications and would increase gabapentin to 1800 mg daily.    Possibility of hypnotherapy and/or acupuncture could be considered as well    Roberto was seen today for follow-up.    Diagnoses and all orders for this visit:    Chronic pain  -     Ambulatory referral to Pain Clinic  -     Ambulatory referral to Physical Therapy    Other orders  -     gabapentin (NEURONTIN) 300 MG capsule; Take 2 capsules (600 mg total) by mouth 3 (three) times a day.          Time: total time spent with the patient was 45 minutes of which >50% was spent in counseling and coordination of care        Allergies   Allergen Reactions     Amoxicillin-Pot Clavulanate Itching and Rash       Medications :  Prior to Admission medications    Medication Sig Start Date End Date Taking? Authorizing Provider   gabapentin (NEURONTIN) 300 MG capsule Take 2 capsules (600 mg total) by mouth 3 (three) times a day. 3/17/17 3/17/18 Yes Raghu Fox MD   HYDROmorphone (DILAUDID) 2 MG tablet One half to one tablet every 4-6 hours as needed for pain. 3/10/17  Yes Roberto Peña MD   magnesium hydroxide (MILK OF MAG) 400 mg/5 mL Susp suspension Take 30 mL by mouth 2 (two) times a day as needed. 2/3/17  Yes Raghu Fox MD   polyethylene glycol (MIRALAX) 17 gram packet Take 1 packet (17 g total) by mouth 2 (two) times a day. 2/3/17  Yes Raghu Fox MD   gabapentin (NEURONTIN) 400 MG capsule Take 1 capsule (400 mg total) by mouth 3 (three) times a day. 1/3/17 3/17/17 Yes Raghu Fox MD   gabapentin (NEURONTIN) 300 MG capsule  3/6/17 3/17/17  PROVIDER, MOISE   HYDROmorphone (DILAUDID) 2 MG tablet One half to one tablet every 4-6 hours as needed for pain. 3/15/17 3/17/17  Raghu Fox MD        Past Medical History:   Past Medical History:   Diagnosis Date     Abducens nerve palsy     left eye     History of anesthesia complications     agitated     Hyperglycemia     mild      Hypertension     mild     Seizures 1986    possible seizure disorder, no further seizures     Ventral hernia        Past Surgical History:   Past Surgical History:   Procedure Laterality Date     LAPAROSCOPIC INCISIONAL / UMBILICAL / VENTRAL HERNIA REPAIR Right 10/26/2016    Procedure: HERNIA REPAIR VENTRAL LAPAROSCOPIC,RIGHT;  Surgeon: Emeterio Mckoy MD;  Location: [facility];  Service:      WISDOM TOOTH EXTRACTION         Social History:   Social History     Social History     Marital status: Single     Spouse name: N/A     Number of children: N/A     Years of education: N/A     Occupational History     Not on file.     Social History Main Topics     Smoking status: Never Smoker     Smokeless tobacco: Never Used     Alcohol use No     Drug use: No     Sexual activity: Not on file     Other Topics Concern     Not on file     Social History Narrative       Family History: No family history on file.      Raghu Fox MD

## 2021-06-14 NOTE — PROGRESS NOTES
"Office Visit - Follow up    Roberto Nieves   62 y.o. male    Date of Visit: 11/28/2017    Chief Complaint   Patient presents with     Follow-up     Check up       Subjective: Here for follow-up of atypical chronic pain syndrome.  Extensive evaluation for chronic abdominal pain both here at [facility] as an outpatient and in [place].  Notes by his internist in [place] Dr. Kelley were again reviewed and discussed.  He is currently on a combination of hydromorphone 2 mg tablets 4 daily in addition to high-dose gabapentin for pain control.  Overall his health has improved he has regained much of the weight he has lost he has had no further exacerbation and he estimates his overall pain has reduced by 10-15%.  He has reduced his need for hydromorphone from 6 daily to 4 daily.  He has been more active physically    Again reviewed symptoms of pain which are essentially unchanged I have reviewed my notes from previous evaluation which was rather extensive    We have continued to follow-up regarding his controlled substance agreement and I have had further discussion regarding her desire for him to reduce his opioid need    He has been seen in the pain clinic and had extensive pain evaluation.  We might consider reassessment at some point in the future    Generally he otherwise has no other complaints    ROS: A comprehensive review of systems was performed and was otherwise negative except as mentioned above.     Exam  Alert and oriented he appears healthier weight as noted BMI has increased from 21-32.  Head and neck negative heart and lungs normal abdomen benign no tenderness skin no changes remainder of detailed exam unchanged   /76  Pulse 82  Ht 5' 7\" (1.702 m)  Wt 205 lb (93 kg)  BMI 32.11 kg/m2    Assessment and Plan  Chronic pain syndrome atypical of uncertain etiology.  Mild hyperglycemia    Weight loss resolved now moderately overweight    Checking labs and reviewed previous records " follow-up by phone regarding labs and will plan to attempt to reduce need for hydromorphone    Follow-up at least 3 times yearly i.e. every 4 months he is in agreement with this.  Consider referral to a pain clinic for reevaluation    Duration of current pain and need for narcotics has just exceeded 1 year    Roberto was seen today for follow-up.    Diagnoses and all orders for this visit:    Weight loss  -     HM2(CBC w/o Differential); Future  -     Comprehensive Metabolic Panel; Future  -     Glycosylated Hemoglobin A1c  -     Erythrocyte Sedimentation Rate  -     HM2(CBC w/o Differential)  -     Comprehensive Metabolic Panel    Hypogastric pain  -     HM2(CBC w/o Differential); Future  -     Comprehensive Metabolic Panel; Future  -     Glycosylated Hemoglobin A1c  -     Erythrocyte Sedimentation Rate  -     HM2(CBC w/o Differential)  -     Comprehensive Metabolic Panel    Hyperglycemia  -     HM2(CBC w/o Differential); Future  -     Comprehensive Metabolic Panel; Future  -     Glycosylated Hemoglobin A1c  -     Erythrocyte Sedimentation Rate  -     HM2(CBC w/o Differential)  -     Comprehensive Metabolic Panel    Chronic pain syndrome  -     HM2(CBC w/o Differential); Future  -     Comprehensive Metabolic Panel; Future  -     Glycosylated Hemoglobin A1c  -     Erythrocyte Sedimentation Rate  -     HM2(CBC w/o Differential)  -     Comprehensive Metabolic Panel    Other orders  -     HYDROmorphone (DILAUDID) 2 MG tablet; One half to one tablet every 4-6 hours as needed for pain.          Time: total time spent with the patient was 25 minutes of which >50% was spent in counseling and coordination of care        Allergies   Allergen Reactions     Amoxicillin-Pot Clavulanate Itching and Rash       Medications :  Prior to Admission medications    Medication Sig Start Date End Date Taking? Authorizing Provider   gabapentin (NEURONTIN) 300 MG capsule Take 2 capsules (600 mg total) by mouth 3 (three) times a day. 11/23/17   Yes Raghu Fox MD   HYDROmorphone (DILAUDID) 2 MG tablet One half to one tablet every 4-6 hours as needed for pain. 11/28/17  Yes Raghu Fox MD   magnesium hydroxide (MILK OF MAG) 400 mg/5 mL Susp suspension Take 30 mL by mouth 2 (two) times a day as needed. 2/3/17  Yes Raghu Fox MD   polyethylene glycol (MIRALAX) 17 gram packet Take 1 packet (17 g total) by mouth 2 (two) times a day. 2/3/17  Yes Raghu Fox MD   HYDROmorphone (DILAUDID) 2 MG tablet One half to one tablet every 4-6 hours as needed for pain. 11/20/17 11/28/17 Yes Raghu Fox MD        Past Medical History:   Past Medical History:   Diagnosis Date     Abducens nerve palsy     left eye     History of anesthesia complications     agitated     Hyperglycemia     mild     Hypertension     mild     Seizures 1986    possible seizure disorder, no further seizures     Ventral hernia        Past Surgical History:   Past Surgical History:   Procedure Laterality Date     LAPAROSCOPIC INCISIONAL / UMBILICAL / VENTRAL HERNIA REPAIR Right 10/26/2016    Procedure: HERNIA REPAIR VENTRAL LAPAROSCOPIC,RIGHT;  Surgeon: Emeterio Mckoy MD;  Location: [facility];  Service:      WISDOM TOOTH EXTRACTION         Social History:   Social History     Social History     Marital status: Single     Spouse name: N/A     Number of children: N/A     Years of education: N/A     Occupational History     Not on file.     Social History Main Topics     Smoking status: Never Smoker     Smokeless tobacco: Never Used     Alcohol use No     Drug use: No     Sexual activity: Not on file     Other Topics Concern     Not on file     Social History Narrative       Family History: No family history on file.      Raghu Fox MD

## 2021-06-14 NOTE — TELEPHONE ENCOUNTER
RN cannot approve Refill Request    RN can NOT refill this medication No established PCP. Last office visit: 9/5/2019 Raghu Fox MD Last Physical: 9/17/2019 Last MTM visit: Visit date not found Last visit same specialty: 9/5/2019 Raghu Fox MD.  Next visit within 3 mo: Visit date not found  Next physical within 3 mo: Visit date not found      Roz Cardenas, [facility] Triage/Med Refill 12/31/2020    Requested Prescriptions   Pending Prescriptions Disp Refills     gabapentin (NEURONTIN) 300 MG capsule [Pharmacy Med Name: GABAPENTIN 300MG CAPSULES] 540 capsule 3     Sig: TAKE 2 CAPSULES(600 MG) BY MOUTH THREE TIMES DAILY       Gabapentin/Levetiracetam/Tiagabine Refill Protocol  Failed - 12/30/2020  4:05 AM        Failed - PCP or prescribing provider visit in past 12 months or next 3 months     Last office visit with prescriber/PCP: 9/5/2019 Raghu Fox MD OR same dept: Visit date not found OR same specialty: 9/5/2019 Raghu Fox MD  Last physical: 9/17/2019 Last MTM visit: Visit date not found   Next visit within 3 mo: Visit date not found  Next physical within 3 mo: Visit date not found  Prescriber OR PCP: Raghu Fox MD  Last diagnosis associated with med order: 1. Chronic bilateral back pain, unspecified back location  - gabapentin (NEURONTIN) 300 MG capsule [Pharmacy Med Name: GABAPENTIN 300MG CAPSULES]; TAKE 2 CAPSULES(600 MG) BY MOUTH THREE TIMES DAILY  Dispense: 540 capsule; Refill: 3    If protocol passes may refill for 12 months if within 3 months of last provider visit (or a total of 15 months).

## 2021-06-15 PROBLEM — R63.4 WEIGHT LOSS: Status: ACTIVE | Noted: 2017-01-10

## 2021-06-15 PROBLEM — M54.50 BACK PAIN OF THORACOLUMBAR REGION: Status: ACTIVE | Noted: 2017-01-31

## 2021-06-15 PROBLEM — M54.9 BACK PAIN: Status: ACTIVE | Noted: 2017-01-10

## 2021-06-15 PROBLEM — M54.6 BACK PAIN OF THORACOLUMBAR REGION: Status: ACTIVE | Noted: 2017-01-31

## 2021-06-15 PROBLEM — R10.2 HYPOGASTRIC PAIN: Status: ACTIVE | Noted: 2017-02-03

## 2021-06-15 NOTE — ANESTHESIA CARE TRANSFER NOTE
Last vitals:   Vitals:    01/18/18 1236   BP: 152/70   Pulse: 72   Resp: 16   Temp: 36.4  C (97.5  F)   SpO2: 100%     Patient's level of consciousness is drowsy  Spontaneous respirations: yes  Maintains airway independently: yes  Dentition unchanged: yes  Oropharynx: oropharynx clear of all foreign objects    QCDR Measures:  ASA# 20 - Surgical Safety Checklist: WHO surgical safety checklist completed prior to induction  PQRS# 430 - Adult PONV Prevention: 4558F - Pt received => 2 anti-emetic agents (different classes) preop & intraop  ASA# 8 - Peds PONV Prevention: NA - Not pediatric patient, not GA or 2 or more risk factors NOT present  PQRS# 424 - Linda-op Temp Management: 4559F - At least one body temp DOCUMENTED => 35.5C or 95.9F within required timeframe  PQRS# 426 - PACU Transfer Protocol: - Transfer of care checklist used  ASA# 14 - Acute Post-op Pain: ASA14B - Patient did NOT experience pain >= 7 out of 10

## 2021-06-15 NOTE — ANESTHESIA PREPROCEDURE EVALUATION
Anesthesia Evaluation      Patient summary reviewed   History of anesthetic complications     Airway   Mallampati: I   Pulmonary - normal exam                          Cardiovascular - normal exam  (+) hypertension, ,      Neuro/Psych    (+) neuromuscular disease,      Comments: H/O abducens palsy    Endo/Other - negative ROS      Comments: H/O chronic pain, daily Dilaudid and gabapentin rx    GI/Hepatic/Renal - negative ROS      Other findings: Hb 15.9, K 3.9  H/O post-op agitation, no issues with last surgery      Dental - normal exam                        Anesthesia Plan  Planned anesthetic: general endotracheal    ASA 2   Induction: intravenous   Anesthetic plan and risks discussed with: patient    Post-op plan: routine recovery

## 2021-06-15 NOTE — PROGRESS NOTES
"Office Visit - Follow up    Roberto Nieves   62 y.o. 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  Recently saw Dr. Mathieu Wise and underwent repair of ventral hernia.  Surgical notes reviewed    Has been doing well however over the last several days has noted a mildly painful bulge on the superior aspect of the previous hernia repair.    ROS: A comprehensive review of systems was performed and was otherwise negative except as mentioned above.     Exam  He does have a smaller component size bulge at the superior aspect of his incision just to the right of midline.  Incision is healing well.   BP (!) 136/92  Pulse 78  Ht 5' 7\" (1.702 m)  Wt 217 lb (98.4 kg)  BMI 33.99 kg/m2    Assessment and Plan  Possible new ventral hernia will refer to Dr. Mathieu Farooq for evaluation    Roberto was seen today for follow-up.    Diagnoses and all orders for this visit:    Essential hypertension    Ventral hernia without obstruction or gangrene    Essential hypertension with goal blood pressure less than 140/90          Time: total time spent with the patient was 15 minutes of which >50% was spent in counseling and coordination of care        Allergies   Allergen Reactions     Amoxicillin-Pot Clavulanate Itching and Rash       Medications :  Prior to Admission medications    Medication Sig Start Date End Date Taking? Authorizing Provider   docusate sodium (COLACE) 100 MG capsule Take 100 mg by mouth 3 (three) times a day as needed for constipation.   Yes PROVIDER, HISTORICAL   gabapentin (NEURONTIN) 300 MG capsule Take 2 capsules (600 mg total) by mouth 3 (three) times a day. 11/23/17  Yes Raghu Fox MD   HYDROmorphone (DILAUDID) 2 MG tablet One half to one tablet every 4-6 hours as needed for pain. 1/9/18  Yes Raghu " NICOLE Fox MD   magnesium hydroxide (MILK OF MAG) 400 mg/5 mL Susp suspension Take 30 mL by mouth 2 (two) times a day as needed. 2/3/17  Yes Raghu Fox MD   polyethylene glycol (MIRALAX) 17 gram packet Take 1 packet (17 g total) by mouth 2 (two) times a day. 2/3/17  Yes Raghu Fox MD        Past Medical History:   Past Medical History:   Diagnosis Date     Abducens nerve palsy     left eye     History of anesthesia complications     agitated     Hyperglycemia     mild     Hypertension     mild     Seizures 1986    possible seizure disorder, no further seizures     Ventral hernia        Past Surgical History:   Past Surgical History:   Procedure Laterality Date     INCISIONAL HERNIA REPAIR N/A 1/18/2018    Procedure: INCISIONAL HERNIA REPAIR WITH MESH;  Surgeon: Mathieu Farooq MD;  Location: [facility];  Service:      LAPAROSCOPIC INCISIONAL / UMBILICAL / VENTRAL HERNIA REPAIR Right 10/26/2016    Procedure: HERNIA REPAIR VENTRAL LAPAROSCOPIC,RIGHT;  Surgeon: Emeterio Mckoy MD;  Location: [facility];  Service:      WISDOM TOOTH EXTRACTION         Social History:   Social History     Social History     Marital status: Single     Spouse name: N/A     Number of children: N/A     Years of education: N/A     Occupational History     Not on file.     Social History Main Topics     Smoking status: Never Smoker     Smokeless tobacco: Never Used     Alcohol use No     Drug use: No     Sexual activity: Not on file     Other Topics Concern     Not on file     Social History Narrative       Family History: No family history on file.      Raghu Fox MD

## 2021-06-15 NOTE — PROGRESS NOTES
"Office Visit - Follow up    Roberto Nieves   62 y.o. male    Date of Visit: 1/9/2018    Chief Complaint   Patient presents with     Mass     Pain in abdominal area since 1/3       Subjective: Father time is 62 years old and is here with concerns regarding a mass or bulge in the right upper quadrant.  Extensive history which includes unexplained chronic abdominal pain requiring analgesics generally Dilaudid 2 mg up to 3 daily.  Has had previous evaluation both here and in [place].    Did have a ventral hernia repair in October 2016 by Dr. Mckoy however did not note resolution of pain.  Has right sided hypogastric and back pain and has had extensive evaluation including CT scan and MRI etc..    Over the last few days he has noted a painful bulge in the right upper quadrant he denies nausea vomiting or obstructive symptoms.  Bowel habits have been stable.  No fever or chills.  No other new complaints although pain is moderate in intensity it is relieved somewhat by hydromorphone although continues to have some tenderness.  Denies recent trauma or unusual activity        ROS: A comprehensive review of systems was performed and was otherwise negative except as mentioned above.     Exam  Head and neck negative lungs clear heart normal he has a 5 x 3 cm palpable mildly tender bulge that has the consistency of fat in the right upper quadrant.  No definite evidence of incarceration although I am and able to manually reduce this.  Abdominal exam otherwise negative. his weight is up about 8 pounds from visit in November    Heart negative lungs clear           /84  Pulse 80  Ht 5' 7\" (1.702 m)  Wt 214 lb (97.1 kg)  BMI 33.52 kg/m2    Assessment and Plan  I arranged for urgent CT scan abdomen which is reviewed personally and with the radiologist.  This revealed evidence of a ventral hernia with fat within the hernial defect no evidence of obstruction there are mild inflammatory changes.    Ventral hernia " symptomatic will refer to Dr. Mathieu Farooq for evaluation no change in regimen we are checking CBC and metabolic profile as well    Addendum this patient is referred to Dr. Wise who does plan takedown of right ventral hernia.  This visit can serve as a appropriate preoperative evaluation with history physical and labs.  EKG could be ordered on the day of surgery as well per discretion of Dr. Wise and anesthesiology.  Roberto was seen today for mass.    Diagnoses and all orders for this visit:    Essential hypertension    Abdominal wall hernia  -     HM2(CBC w/o Differential); Future  -     Comprehensive Metabolic Panel; Future  -     Cancel: CT Abdomen Pelvis Without Oral Without IV Contrast; Future  -     HM2(CBC w/o Differential)  -     Comprehensive Metabolic Panel    Ventral hernia without obstruction or gangrene    Chronic bilateral back pain, unspecified back location    Hypogastric pain    Other orders  -     HYDROmorphone (DILAUDID) 2 MG tablet; One half to one tablet every 4-6 hours as needed for pain.          Time: total time spent with the patient was 30 minutes of which >50% was spent in counseling and coordination of care        Allergies   Allergen Reactions     Amoxicillin-Pot Clavulanate Itching and Rash       Medications :  Prior to Admission medications    Medication Sig Start Date End Date Taking? Authorizing Provider   docusate sodium (COLACE) 100 MG capsule Take 100 mg by mouth 3 (three) times a day as needed for constipation.   Yes PROVIDER, HISTORICAL   gabapentin (NEURONTIN) 300 MG capsule Take 2 capsules (600 mg total) by mouth 3 (three) times a day. 11/23/17  Yes Raghu Fox MD   HYDROmorphone (DILAUDID) 2 MG tablet One half to one tablet every 4-6 hours as needed for pain. 1/9/18  Yes Raghu Fox MD   magnesium hydroxide (MILK OF MAG) 400 mg/5 mL Susp suspension Take 30 mL by mouth 2 (two) times a day as needed. 2/3/17  Yes Raghu Fox MD   polyethylene glycol (MIRALAX) 17  gram packet Take 1 packet (17 g total) by mouth 2 (two) times a day. 2/3/17  Yes Raghu Fox MD   HYDROmorphone (DILAUDID) 2 MG tablet One half to one tablet every 4-6 hours as needed for pain.  Patient taking differently: Take 2 mg by mouth every 6 (six) hours as needed.  1/2/18 1/9/18 Yes Raghu Fox MD        Past Medical History:   Past Medical History:   Diagnosis Date     Abducens nerve palsy     left eye     History of anesthesia complications     agitated     Hyperglycemia     mild     Hypertension     mild     Seizures 1986    possible seizure disorder, no further seizures     Ventral hernia        Past Surgical History:   Past Surgical History:   Procedure Laterality Date     LAPAROSCOPIC INCISIONAL / UMBILICAL / VENTRAL HERNIA REPAIR Right 10/26/2016    Procedure: HERNIA REPAIR VENTRAL LAPAROSCOPIC,RIGHT;  Surgeon: Emeterio Mckoy MD;  Location: [facility];  Service:      WISDOM TOOTH EXTRACTION         Social History:   Social History     Social History     Marital status: Single     Spouse name: N/A     Number of children: N/A     Years of education: N/A     Occupational History     Not on file.     Social History Main Topics     Smoking status: Never Smoker     Smokeless tobacco: Never Used     Alcohol use No     Drug use: No     Sexual activity: Not on file     Other Topics Concern     Not on file     Social History Narrative       Family History: No family history on file.      Raghu Fox MD

## 2021-06-15 NOTE — ANESTHESIA POSTPROCEDURE EVALUATION
Patient: Roberto Nieves  INCISIONAL HERNIA REPAIR WITH MESH  Anesthesia type: general    Patient location: PACU  Last vitals:   Vitals:    01/18/18 1315   BP: 128/75   Pulse: 75   Resp: 13   Temp: 36.7  C (98.1  F)   SpO2: (!) 88%     Post vital signs: stable  Level of consciousness: awake and responds to simple questions  Post-anesthesia pain: pain controlled  Post-anesthesia nausea and vomiting: no  Pulmonary: unassisted, return to baseline  Cardiovascular: stable and blood pressure at baseline  Hydration: adequate  Anesthetic events: no    QCDR Measures:  ASA# 11 - Linda-op Cardiac Arrest: ASA11B - Patient did NOT experience unanticipated cardiac arrest  ASA# 12 - Linda-op Mortality Rate: ASA12B - Patient did NOT die  ASA# 13 - PACU Re-Intubation Rate: ASA13B - Patient did NOT require a new airway mgmt  ASA# 10 - Composite Anes Safety: ASA10A - No serious adverse event    Additional Notes:

## 2021-06-16 PROBLEM — K43.9 ABDOMINAL WALL HERNIA: Status: ACTIVE | Noted: 2018-01-09

## 2021-06-21 NOTE — PROGRESS NOTES
Office Visit - Follow up    Roberto Nieves   63 y.o. male    Date of Visit: 11/1/2018    Chief Complaint   Patient presents with     Annual Exam     Pt is fasting       Subjective: Here for regular physical exam and follow-up of chronic pain syndrome.    Patient's pain history dates back to the fall 2016 when he presented with bilateral flank and abdominal pain.  Etiology was uncertain.  Consideration was given to the possibility his pain was precipitated by a abdominal wall hernia and underwent repair of this however noted no improvement.  Pain progressively increased to the point that he was totally disabled.  Total weight loss was approximately 30 pounds some of which he has regained.  Extensive evaluation was performed at that time which included multiple imaging studies i.e. CT scan of abdomen and pelvis MRI of thoracic spine repeat CT scan whole body nuclear bone scan a whole body PET scan and follow-up CT of abdomen performed earlier this year.  Additional comprehensive labs were likewise negative.    Patient was seen at the [facility] for additional evaluation there are notes were all reviewed in detail today and revealed no new specific abnormalities and had no other specific recommendations other than analgesics    The patient has required combination of relatively high-dose hydromorphone in addition to gabapentin for adequate pain control.  He remains totally disabled from performing his duties as a .  Weight has improved notes no other change in his overall symptoms    Additional comorbidities have included ventral hernia repair as noted intermittent mild hyperglycemia and generalized arthropathy in addition to hypertension    Current medications reviewed as noted.  A controlled substance agreement was again completed.    No other new complaints    Personal social and family history reviewed    PHQ 9 is negative for significant major depressive illness arthropathy    He notes  "no    ROS: A comprehensive review of systems was performed and was otherwise negative except as mentioned above.     Exam  Alert and oriented with normal mental status head and neck negative EENT unremarkable lungs clear heart normal abdomen benign extremities negative peripheral pulses normal rectal negative neuro exam negative normal physical exam with the exception of obesity   /84  Pulse 98  Ht 5' 7\" (1.702 m)  Wt (!) 236 lb (107 kg)  BMI 36.96 kg/m2    Assessment and Plan  Chronic pain syndrome still of uncertain or unclear in etiology with negative evaluation.  Has been \"stable\" on current dose of hydromorphone.  We did review the controlled substance agreement.  He is comfortable with signing this agreement and we will continue his dose of 2 mg of oral hydromorphone every 6 hours in addition to gabapentin.    I would however like to have him evaluated by the pain clinic and further direction regarding future considerations and consideration of alternative non-opioid analgesics.  We have tried multiple agents in the past and these are available in note    Will be in touch with the pain clinic after their initial evaluation    No other change    Roberto was seen today for annual exam.    Diagnoses and all orders for this visit:    Essential hypertension  -     HM2(CBC w/o Differential); Future  -     Comprehensive Metabolic Panel; Future  -     Lipid [place]; Future  -     PSA (Prostatic-Specific Antigen), Annual Screen  -     Ambulatory referral to Pain Clinic  -     HM2(CBC w/o Differential)  -     Comprehensive Metabolic Panel  -     Lipid [place]    Abdominal wall hernia  -     HM2(CBC w/o Differential); Future  -     Comprehensive Metabolic Panel; Future  -     Lipid [place]; Future  -     PSA (Prostatic-Specific Antigen), Annual Screen  -     Ambulatory referral to Pain Clinic  -     HM2(CBC w/o Differential)  -     Comprehensive Metabolic Panel  -     Lipid [place]    Hyperglycemia  -     " HM2(CBC w/o Differential); Future  -     Comprehensive Metabolic Panel; Future  -     Lipid [place]ascade; Future  -     PSA (Prostatic-Specific Antigen), Annual Screen  -     Ambulatory referral to Pain Clinic  -     [facility](CBC w/o Differential)  -     Comprehensive Metabolic Panel  -     Lipid [place]    Weight loss  -     2(CBC w/o Differential); Future  -     Comprehensive Metabolic Panel; Future  -     Lipid [place]; Future  -     PSA (Prostatic-Specific Antigen), Annual Screen  -     Ambulatory referral to Pain Clinic  -     [facility](CBC w/o Differential)  -     Comprehensive Metabolic Panel  -     Lipid [place]    Ventral hernia without obstruction or gangrene  -     2(CBC w/o Differential); Future  -     Comprehensive Metabolic Panel; Future  -     Lipid Cascade; Future  -     PSA (Prostatic-Specific Antigen), Annual Screen  -     Ambulatory referral to Pain Clinic  -     [facility](CBC w/o Differential)  -     Comprehensive Metabolic Panel  -     Lipid [place]    Essential hypertension with goal blood pressure less than 140/90  -     2(CBC w/o Differential); Future  -     Comprehensive Metabolic Panel; Future  -     Lipid [place]ascade; Future  -     PSA (Prostatic-Specific Antigen), Annual Screen  -     Ambulatory referral to Pain Clinic  -     [facility](CBC w/o Differential)  -     Comprehensive Metabolic Panel  -     Lipid [place]    Chronic pain syndrome  -     2(CBC w/o Differential); Future  -     Comprehensive Metabolic Panel; Future  -     Lipid [place]; Future  -     PSA (Prostatic-Specific Antigen), Annual Screen  -     Ambulatory referral to Pain Clinic  -     2(CBC w/o Differential)  -     Comprehensive Metabolic Panel  -     Lipid [place]    Routine general medical examination at a health care facility  -     2(CBC w/o Differential); Future  -     Comprehensive Metabolic Panel; Future  -     Lipid [place]; Future  -     PSA (Prostatic-Specific Antigen), Annual Screen  -     Ambulatory referral to Pain Clinic  -      HM2(CBC w/o Differential)  -     Comprehensive Metabolic Panel  -     Lipid [place]    Chronic bilateral back pain, unspecified back location  -     Ambulatory referral to Pain Clinic  -     HYDROmorphone (DILAUDID) 2 MG tablet; One half to one tablet every 4-6 hours as needed for pain.          Time: total time spent with the patient was 65 minutes of which >50% was spent in counseling and coordination of care        Allergies   Allergen Reactions     Amoxicillin-Pot Clavulanate Itching and Rash       Medications :  Prior to Admission medications    Medication Sig Start Date End Date Taking? Authorizing Provider   docusate sodium (COLACE) 100 MG capsule Take 100 mg by mouth 3 (three) times a day as needed for constipation.   Yes PROVIDER, HISTORICAL   gabapentin (NEURONTIN) 300 MG capsule Take 2 capsules (600 mg total) by mouth 3 (three) times a day. 8/13/18  Yes Raghu Fox MD   HYDROmorphone (DILAUDID) 2 MG tablet One half to one tablet every 4-6 hours as needed for pain. 11/1/18  Yes Raghu Fox MD   magnesium hydroxide (MILK OF MAG) 400 mg/5 mL Susp suspension Take 30 mL by mouth 2 (two) times a day as needed. 2/3/17  Yes Raghu Fox MD   polyethylene glycol (MIRALAX) 17 gram packet Take 1 packet (17 g total) by mouth 2 (two) times a day. 2/3/17  Yes Raghu Fox MD   HYDROmorphone (DILAUDID) 2 MG tablet One half to one tablet every 4-6 hours as needed for pain. 10/30/18 11/1/18 Yes Raghu Fox MD        Past Medical History:   Past Medical History:   Diagnosis Date     Abducens nerve palsy     left eye     History of anesthesia complications     agitated     Hyperglycemia     mild     Hypertension     mild     Seizures (H) 1986    possible seizure disorder, no further seizures     Ventral hernia        Past Surgical History:   Past Surgical History:   Procedure Laterality Date     INCISIONAL HERNIA REPAIR N/A 1/18/2018    Procedure: INCISIONAL HERNIA REPAIR WITH MESH;  Surgeon: Mathieu HIGHTOWER  MD Oseas;  Location: [facility];  Service:      LAPAROSCOPIC INCISIONAL / UMBILICAL / VENTRAL HERNIA REPAIR Right 10/26/2016    Procedure: HERNIA REPAIR VENTRAL LAPAROSCOPIC,RIGHT;  Surgeon: Emeterio Mckoy MD;  Location: [facility];  Service:      WISDOM TOOTH EXTRACTION         Social History:   Social History     Social History     Marital status: Single     Spouse name: N/A     Number of children: N/A     Years of education: N/A     Occupational History     Not on file.     Social History Main Topics     Smoking status: Never Smoker     Smokeless tobacco: Never Used     Alcohol use No     Drug use: No     Sexual activity: Not on file     Other Topics Concern     Not on file     Social History Narrative       Family History: No family history on file.      Raghu Fox MD

## 2021-06-23 NOTE — TELEPHONE ENCOUNTER
Prescription Monitoring Program activity reviewed with no discrepancies noted.  Last fill per : 12/31/2018    Controlled Substance Agreement on file: Yes  Date: 11/1/2018    Last office visit with provider:  1/30/2018 Raghu Fox MD    Please advise.

## 2021-06-23 NOTE — TELEPHONE ENCOUNTER
Prescription Monitoring Program activity reviewed with no discrepancies noted.  Last fill per : 1/14/2019    Controlled Substance Agreement on file: Yes  Date: 11/1/2018st office visit with provider:  1/30/2018 Raghu Fox MD    Please advise.

## 2021-06-23 NOTE — TELEPHONE ENCOUNTER
Controlled Substance Refill Request  Medication Name:   Requested Prescriptions     Pending Prescriptions Disp Refills     HYDROmorphone (DILAUDID) 2 MG tablet 60 tablet 0     Sig: One half to one tablet every 4-6 hours as needed for pain.     Date Last Fill: 1/28/2019  Pharmacy: [facility] [address]      Submit electronically to pharmacy  Controlled Substance Agreement Date Scanned:   Encounter-Level CSA Scan Date:    There are no encounter-level csa scan date.       Last office visit with prescriber/PCP: 1/30/2018 Raghu Fox MD OR same dept: Visit date not found OR same specialty: 1/30/2018 Raghu Fox MD  Last physical: 11/1/2018 Last MTM visit: Visit date not found

## 2021-06-23 NOTE — TELEPHONE ENCOUNTER
Refill Request  Did you contact pharmacy: No  Medication name:   Requested Prescriptions     Pending Prescriptions Disp Refills     gabapentin (NEURONTIN) 300 MG capsule 540 capsule 3     Sig: Take 2 capsules (600 mg total) by mouth 3 (three) times a day.     Who prescribed the medication: Dr Fox  Pharmacy Name and Location: [facility].  Is patient out of medication: no, and patient informed that he has 3 refills of medication but insisted that he does not.  Patient notified refills processed in 72 hours:  yes  Okay to leave a detailed message: yes

## 2021-06-23 NOTE — TELEPHONE ENCOUNTER
Controlled Substance Refill Request  Medication Name:   Requested Prescriptions     Pending Prescriptions Disp Refills     HYDROmorphone (DILAUDID) 2 MG tablet 60 tablet 0     Sig: One half to one tablet every 4-6 hours as needed for pain.     Date Last Fill: 01/03/19  Pharmacy: [id number] - [place], [place] - [address] [facility] [address]    Submit electronically to pharmacy  Controlled Substance Agreement Date Scanned:   Encounter-Level CSA Scan Date:    There are no encounter-level csa scan date.       Last office visit with prescriber/PCP: 1/30/2018 Raghu Fox MD OR same dept: 1/30/2018 Raghu Fox MD OR same specialty: 1/30/2018 Raghu Fox MD  Last physical: 11/1/2018 Last MTM visit: Visit date not found

## 2021-06-23 NOTE — TELEPHONE ENCOUNTER
Controlled Substance Refill Request  Medication Name:   Requested Prescriptions     Pending Prescriptions Disp Refills     HYDROmorphone (DILAUDID) 2 MG tablet 60 tablet 0     Sig: One half to one tablet every 4-6 hours as needed for pain.     Date Last Fill: 1/14/19  Pharmacy: [facility]      Submit electronically to pharmacy  Controlled Substance Agreement Date Scanned:   Encounter-Level CSA Scan Date:    There are no encounter-level csa scan date.       Last office visit with prescriber/PCP: 1/30/2018 Raghu Fox MD OR same dept: 1/30/2018 Raghu Fox MD OR same specialty: 1/30/2018 Raghu Fox MD  Last physical: 11/1/2018 Last MTM visit: Visit date not found

## 2021-06-24 NOTE — TELEPHONE ENCOUNTER
Refill Approved    Rx renewed per Medication Renewal Policy. Medication was last renewed on 11/9/18.    Alix Win, [facility] Triage/Med Refill 2/13/2019     Requested Prescriptions   Pending Prescriptions Disp Refills     gabapentin (NEURONTIN) 300 MG capsule 540 capsule 3     Sig: Take 2 capsules (600 mg total) by mouth 3 (three) times a day.    Gabapentin/Levetiracetam/Tiagabine Refill Protocol  Passed - 2/11/2019  1:13 PM       Passed - PCP or prescribing provider visit in past 12 months or next 3 months    Last office visit with prescriber/PCP: 1/30/2018 Raghu Fox MD OR same dept: Visit date not found OR same specialty: 1/30/2018 Raghu Fox MD  Last physical: 11/1/2018 Last MTM visit: Visit date not found   Next visit within 3 mo: Visit date not found  Next physical within 3 mo: Visit date not found  Prescriber OR PCP: Raghu Fox MD  Last diagnosis associated with med order: 1. Chronic bilateral back pain, unspecified back location  - gabapentin (NEURONTIN) 300 MG capsule; Take 2 capsules (600 mg total) by mouth 3 (three) times a day.  Dispense: 540 capsule; Refill: 3    If protocol passes may refill for 12 months if within 3 months of last provider visit (or a total of 15 months).

## 2021-06-24 NOTE — TELEPHONE ENCOUNTER
Controlled Substance Refill Request  Medication Name:   Requested Prescriptions     Pending Prescriptions Disp Refills     HYDROmorphone (DILAUDID) 2 MG tablet 60 tablet 0     Sig: One half to one tablet every 4-6 hours as needed for pain.     Date Last Fill: 2/11/19  Pharmacy: [facility]       Submit electronically to pharmacy  Controlled Substance Agreement Date Scanned:   Encounter-Level CSA Scan Date:    There are no encounter-level csa scan date.       Last office visit with prescriber/PCP: 1/30/2018 Raghu Fox MD OR same dept: Visit date not found OR same specialty: 1/30/2018 Raghu Fox MD  Last physical: 11/1/2018 Last MTM visit: Visit date not found

## 2021-06-24 NOTE — TELEPHONE ENCOUNTER
Controlled Substance Refill Request  Medication Name:   Requested Prescriptions     Pending Prescriptions Disp Refills     HYDROmorphone (DILAUDID) 2 MG tablet 60 tablet 0     Sig: One half to one tablet every 4-6 hours as needed for pain.     Date Last Fill: 2/25/19  Pharmacy: [id number]      Submit electronically to pharmacy  Controlled Substance Agreement Date Scanned:   Encounter-Level CSA Scan Date:    There are no encounter-level csa scan date.       Last office visit with prescriber/PCP: 1/30/2018 Raghu Fox MD OR same dept: Visit date not found OR same specialty: 1/30/2018 Raghu Fox MD  Last physical: 11/1/2018 Last MTM visit: Visit date not found

## 2021-07-03 NOTE — ADDENDUM NOTE
Addendum Note by Loren Alvarado MD at 1/9/2018  4:48 PM     Author: Loren Alvarado MD Service: -- Author Type: Physician    Filed: 1/9/2018  4:48 PM Encounter Date: 1/9/2018 Status: Signed    : Loren Alvarado MD (Physician)    Addended by: LOREN ALVARADO on: 1/9/2018 04:48 PM        Modules accepted: Orders

## 2021-07-03 NOTE — ADDENDUM NOTE
Addendum Note by Loren Alvarado MD at 11/29/2017 11:16 AM     Author: Loren Alvarado MD Service: -- Author Type: Physician    Filed: 11/29/2017 11:16 AM Encounter Date: 11/28/2017 Status: Signed    : Loren Alvarado MD (Physician)    Addended by: LOREN ALVARADO on: 11/29/2017 11:16 AM        Modules accepted: Orders